# Patient Record
Sex: FEMALE | Race: WHITE | NOT HISPANIC OR LATINO | Employment: OTHER | URBAN - METROPOLITAN AREA
[De-identification: names, ages, dates, MRNs, and addresses within clinical notes are randomized per-mention and may not be internally consistent; named-entity substitution may affect disease eponyms.]

---

## 2020-09-23 ENCOUNTER — DOCUMENTATION (OUTPATIENT)
Dept: PREADMISSION TESTING | Facility: HOSPITAL | Age: 75
End: 2020-09-23

## 2020-09-25 RX ORDER — VITAMIN E 268 MG
400 CAPSULE ORAL
COMMUNITY
End: 2021-01-25 | Stop reason: HOSPADM

## 2020-09-25 RX ORDER — CIPROFLOXACIN HYDROCHLORIDE 3.5 MG/ML
1 SOLUTION/ DROPS TOPICAL 3 TIMES DAILY
Status: ON HOLD | COMMUNITY
End: 2020-10-05 | Stop reason: SDUPTHER

## 2020-09-25 RX ORDER — CLONIDINE HYDROCHLORIDE 0.2 MG/1
0.2 TABLET ORAL 2 TIMES DAILY
COMMUNITY

## 2020-09-25 RX ORDER — ATROPINE SULFATE 10 MG/ML
1 SOLUTION/ DROPS OPHTHALMIC 3 TIMES DAILY
COMMUNITY
End: 2020-10-05 | Stop reason: HOSPADM

## 2020-09-25 RX ORDER — PREDNISOLONE ACETATE 10 MG/ML
1 SUSPENSION/ DROPS OPHTHALMIC 4 TIMES DAILY
COMMUNITY
End: 2020-11-06

## 2020-09-25 RX ORDER — DOXAZOSIN 2 MG/1
2 TABLET ORAL
COMMUNITY
Start: 2020-09-03

## 2020-09-25 RX ORDER — KETOROLAC TROMETHAMINE 5 MG/ML
1 SOLUTION OPHTHALMIC 4 TIMES DAILY
COMMUNITY
End: 2020-11-06

## 2020-09-29 DIAGNOSIS — Z20.822 COVID-19 RULED OUT BY LABORATORY TESTING: ICD-10-CM

## 2020-09-29 PROCEDURE — U0003 INFECTIOUS AGENT DETECTION BY NUCLEIC ACID (DNA OR RNA); SEVERE ACUTE RESPIRATORY SYNDROME CORONAVIRUS 2 (SARS-COV-2) (CORONAVIRUS DISEASE [COVID-19]), AMPLIFIED PROBE TECHNIQUE, MAKING USE OF HIGH THROUGHPUT TECHNOLOGIES AS DESCRIBED BY CMS-2020-01-R: HCPCS | Performed by: OPHTHALMOLOGY

## 2020-09-30 LAB — SARS-COV-2 RNA SPEC QL NAA+PROBE: NOT DETECTED

## 2020-10-04 ENCOUNTER — ANESTHESIA EVENT (OUTPATIENT)
Dept: PERIOP | Facility: AMBULARY SURGERY CENTER | Age: 75
End: 2020-10-04
Payer: MEDICARE

## 2020-10-04 PROBLEM — R11.2 PONV (POSTOPERATIVE NAUSEA AND VOMITING): Status: ACTIVE | Noted: 2020-10-04

## 2020-10-04 PROBLEM — E78.5 HYPERLIPIDEMIA: Status: ACTIVE | Noted: 2020-10-04

## 2020-10-04 PROBLEM — I10 HTN (HYPERTENSION): Status: ACTIVE | Noted: 2020-10-04

## 2020-10-04 PROBLEM — Z92.89 HISTORY OF PSYCHIATRIC CARE: Status: ACTIVE | Noted: 2020-10-04

## 2020-10-04 PROBLEM — Z90.710 HISTORY OF HYSTERECTOMY: Status: ACTIVE | Noted: 2020-10-04

## 2020-10-04 PROBLEM — H53.9 VISUAL DISORDER: Status: ACTIVE | Noted: 2020-10-04

## 2020-10-04 PROBLEM — K21.9 GASTROESOPHAGEAL REFLUX DISEASE: Status: ACTIVE | Noted: 2020-10-04

## 2020-10-04 PROBLEM — Z98.890 PONV (POSTOPERATIVE NAUSEA AND VOMITING): Status: ACTIVE | Noted: 2020-10-04

## 2020-10-04 PROBLEM — F41.9 ANXIETY: Status: ACTIVE | Noted: 2020-10-04

## 2020-10-04 PROBLEM — M19.90 ARTHRITIS: Status: ACTIVE | Noted: 2020-10-04

## 2020-10-04 RX ORDER — LIDOCAINE HYDROCHLORIDE 20 MG/ML
1 JELLY TOPICAL
Status: COMPLETED | OUTPATIENT
Start: 2020-10-04 | End: 2020-10-05

## 2020-10-05 ENCOUNTER — HOSPITAL ENCOUNTER (OUTPATIENT)
Facility: AMBULARY SURGERY CENTER | Age: 75
Setting detail: OUTPATIENT SURGERY
Discharge: HOME | End: 2020-10-05
Attending: OPHTHALMOLOGY | Admitting: OPHTHALMOLOGY
Payer: MEDICARE

## 2020-10-05 ENCOUNTER — ANESTHESIA (OUTPATIENT)
Dept: PERIOP | Facility: AMBULARY SURGERY CENTER | Age: 75
End: 2020-10-05
Payer: MEDICARE

## 2020-10-05 VITALS
TEMPERATURE: 96.5 F | SYSTOLIC BLOOD PRESSURE: 219 MMHG | WEIGHT: 207 LBS | BODY MASS INDEX: 31.37 KG/M2 | HEIGHT: 68 IN | RESPIRATION RATE: 20 BRPM | OXYGEN SATURATION: 95 % | HEART RATE: 60 BPM | DIASTOLIC BLOOD PRESSURE: 89 MMHG

## 2020-10-05 VITALS — HEART RATE: 63 BPM

## 2020-10-05 DIAGNOSIS — Z20.822 COVID-19 RULED OUT BY LABORATORY TESTING: Primary | ICD-10-CM

## 2020-10-05 DIAGNOSIS — H25.12 AGE-RELATED NUCLEAR CATARACT OF LEFT EYE: ICD-10-CM

## 2020-10-05 PROCEDURE — V2632 POST CHMBR INTRAOCULAR LENS: HCPCS | Performed by: OPHTHALMOLOGY

## 2020-10-05 DEVICE — IOL SN60WF 21.5: Type: IMPLANTABLE DEVICE | Site: EYE | Status: FUNCTIONAL

## 2020-10-05 RX ORDER — LIDOCAINE HYDROCHLORIDE 10 MG/ML
INJECTION, SOLUTION EPIDURAL; INFILTRATION; INTRACAUDAL; PERINEURAL AS NEEDED
Status: DISCONTINUED | OUTPATIENT
Start: 2020-10-05 | End: 2020-10-05 | Stop reason: HOSPADM

## 2020-10-05 RX ORDER — PHENYLEPHRINE HCL 2.5 %
1 DROPS OPHTHALMIC (EYE)
Status: COMPLETED | OUTPATIENT
Start: 2020-10-05 | End: 2020-10-05

## 2020-10-05 RX ORDER — TETRACAINE HYDROCHLORIDE 5 MG/ML
1 SOLUTION OPHTHALMIC ONCE
Status: COMPLETED | OUTPATIENT
Start: 2020-10-05 | End: 2020-10-05

## 2020-10-05 RX ORDER — FENTANYL CITRATE 50 UG/ML
INJECTION, SOLUTION INTRAMUSCULAR; INTRAVENOUS AS NEEDED
Status: DISCONTINUED | OUTPATIENT
Start: 2020-10-05 | End: 2020-10-05

## 2020-10-05 RX ORDER — ONDANSETRON 2 MG/ML
INJECTION INTRAMUSCULAR; INTRAVENOUS AS NEEDED
Status: DISCONTINUED | OUTPATIENT
Start: 2020-10-05 | End: 2020-10-05

## 2020-10-05 RX ORDER — CIPROFLOXACIN HYDROCHLORIDE 3.5 MG/ML
1 SOLUTION/ DROPS TOPICAL 4 TIMES DAILY
Qty: 5 ML | Refills: 0 | Status: SHIPPED | OUTPATIENT
Start: 2020-10-05 | End: 2020-11-06

## 2020-10-05 RX ORDER — TETRACAINE HYDROCHLORIDE 5 MG/ML
SOLUTION OPHTHALMIC AS NEEDED
Status: DISCONTINUED | OUTPATIENT
Start: 2020-10-05 | End: 2020-10-05 | Stop reason: HOSPADM

## 2020-10-05 RX ORDER — DEXAMETHASONE SODIUM PHOSPHATE 4 MG/ML
INJECTION, SOLUTION INTRA-ARTICULAR; INTRALESIONAL; INTRAMUSCULAR; INTRAVENOUS; SOFT TISSUE AS NEEDED
Status: DISCONTINUED | OUTPATIENT
Start: 2020-10-05 | End: 2020-10-05

## 2020-10-05 RX ORDER — GATIFLOXACIN 5 MG/ML
SOLUTION/ DROPS OPHTHALMIC AS NEEDED
Status: DISCONTINUED | OUTPATIENT
Start: 2020-10-05 | End: 2020-10-05 | Stop reason: HOSPADM

## 2020-10-05 RX ORDER — BALANCED SALT SOLUTION 6.4; .75; .48; .3; 3.9; 1.7 MG/ML; MG/ML; MG/ML; MG/ML; MG/ML; MG/ML
SOLUTION OPHTHALMIC AS NEEDED
Status: DISCONTINUED | OUTPATIENT
Start: 2020-10-05 | End: 2020-10-05 | Stop reason: HOSPADM

## 2020-10-05 RX ORDER — CYCLOPENTOLATE HYDROCHLORIDE 10 MG/ML
1 SOLUTION/ DROPS OPHTHALMIC
Status: COMPLETED | OUTPATIENT
Start: 2020-10-05 | End: 2020-10-05

## 2020-10-05 RX ADMIN — PHENYLEPHRINE HYDROCHLORIDE 1 DROP: 25 SOLUTION/ DROPS OPHTHALMIC at 07:15

## 2020-10-05 RX ADMIN — CYCLOPENTOLATE HYDROCHLORIDE 1 DROP: 10 SOLUTION/ DROPS OPHTHALMIC at 07:30

## 2020-10-05 RX ADMIN — FENTANYL CITRATE 100 MCG: 50 INJECTION, SOLUTION INTRAMUSCULAR; INTRAVENOUS at 07:42

## 2020-10-05 RX ADMIN — TETRACAINE HYDROCHLORIDE 1 DROP: 5 SOLUTION OPHTHALMIC at 07:04

## 2020-10-05 RX ADMIN — PHENYLEPHRINE HYDROCHLORIDE 1 DROP: 25 SOLUTION/ DROPS OPHTHALMIC at 07:30

## 2020-10-05 RX ADMIN — DEXAMETHASONE SODIUM PHOSPHATE 4 MG: 4 INJECTION, SOLUTION INTRA-ARTICULAR; INTRALESIONAL; INTRAMUSCULAR; INTRAVENOUS; SOFT TISSUE at 07:42

## 2020-10-05 RX ADMIN — PHENYLEPHRINE HYDROCHLORIDE 1 DROP: 25 SOLUTION/ DROPS OPHTHALMIC at 07:04

## 2020-10-05 RX ADMIN — LIDOCAINE HYDROCHLORIDE 1 APPLICATION: 20 JELLY TOPICAL at 07:04

## 2020-10-05 RX ADMIN — CYCLOPENTOLATE HYDROCHLORIDE 1 DROP: 10 SOLUTION/ DROPS OPHTHALMIC at 07:04

## 2020-10-05 RX ADMIN — CYCLOPENTOLATE HYDROCHLORIDE 1 DROP: 10 SOLUTION/ DROPS OPHTHALMIC at 07:39

## 2020-10-05 RX ADMIN — PHENYLEPHRINE HYDROCHLORIDE 1 DROP: 25 SOLUTION/ DROPS OPHTHALMIC at 07:40

## 2020-10-05 RX ADMIN — LIDOCAINE HYDROCHLORIDE 1 APPLICATION: 20 JELLY TOPICAL at 07:15

## 2020-10-05 RX ADMIN — ONDANSETRON 4 MG: 2 INJECTION INTRAMUSCULAR; INTRAVENOUS at 07:42

## 2020-10-05 RX ADMIN — CYCLOPENTOLATE HYDROCHLORIDE 1 DROP: 10 SOLUTION/ DROPS OPHTHALMIC at 07:15

## 2020-10-05 RX ADMIN — LIDOCAINE HYDROCHLORIDE 1 APPLICATION: 20 JELLY TOPICAL at 07:30

## 2020-11-06 RX ORDER — ASPIRIN 81 MG/1
TABLET ORAL EVERY EVENING
COMMUNITY

## 2020-11-10 DIAGNOSIS — Z20.822 COVID-19 RULED OUT BY LABORATORY TESTING: ICD-10-CM

## 2020-11-10 PROCEDURE — U0003 INFECTIOUS AGENT DETECTION BY NUCLEIC ACID (DNA OR RNA); SEVERE ACUTE RESPIRATORY SYNDROME CORONAVIRUS 2 (SARS-COV-2) (CORONAVIRUS DISEASE [COVID-19]), AMPLIFIED PROBE TECHNIQUE, MAKING USE OF HIGH THROUGHPUT TECHNOLOGIES AS DESCRIBED BY CMS-2020-01-R: HCPCS | Performed by: OPHTHALMOLOGY

## 2020-11-11 ENCOUNTER — TRANSCRIBE ORDERS (OUTPATIENT)
Dept: ADMINISTRATIVE | Facility: HOSPITAL | Age: 75
End: 2020-11-11

## 2020-11-11 DIAGNOSIS — Z12.31 ENCOUNTER FOR SCREENING MAMMOGRAM FOR MALIGNANT NEOPLASM OF BREAST: Primary | ICD-10-CM

## 2020-11-11 LAB — SARS-COV-2 RNA SPEC QL NAA+PROBE: NOT DETECTED

## 2020-11-15 ENCOUNTER — ANESTHESIA EVENT (OUTPATIENT)
Dept: PERIOP | Facility: AMBULARY SURGERY CENTER | Age: 75
End: 2020-11-15
Payer: MEDICARE

## 2020-11-16 ENCOUNTER — HOSPITAL ENCOUNTER (OUTPATIENT)
Facility: AMBULARY SURGERY CENTER | Age: 75
Setting detail: OUTPATIENT SURGERY
Discharge: HOME | End: 2020-11-16
Attending: OPHTHALMOLOGY | Admitting: OPHTHALMOLOGY
Payer: MEDICARE

## 2020-11-16 ENCOUNTER — ANESTHESIA (OUTPATIENT)
Dept: PERIOP | Facility: AMBULARY SURGERY CENTER | Age: 75
End: 2020-11-16
Payer: MEDICARE

## 2020-11-16 VITALS
HEART RATE: 62 BPM | SYSTOLIC BLOOD PRESSURE: 206 MMHG | TEMPERATURE: 96.5 F | RESPIRATION RATE: 18 BRPM | DIASTOLIC BLOOD PRESSURE: 95 MMHG | OXYGEN SATURATION: 96 %

## 2020-11-16 VITALS — HEART RATE: 63 BPM

## 2020-11-16 DIAGNOSIS — Z20.822 COVID-19 RULED OUT BY LABORATORY TESTING: Primary | ICD-10-CM

## 2020-11-16 DIAGNOSIS — H25.11 AGE-RELATED NUCLEAR CATARACT OF RIGHT EYE: ICD-10-CM

## 2020-11-16 PROCEDURE — V2632 POST CHMBR INTRAOCULAR LENS: HCPCS | Performed by: OPHTHALMOLOGY

## 2020-11-16 DEVICE — ACRYSOF(R) IQ ASPHERIC NATURAL IOL, SINGLE-PIECE ACRYLIC FOLDABLE PCL, UV WITH BLUE LIGHTFILTER, 13.0MM LENGTH, 6.0MM ANTERIORASYMMETRIC BICONVEX OPTIC, PLANAR HAPTICS.
Type: IMPLANTABLE DEVICE | Status: FUNCTIONAL
Brand: ACRYSOF®

## 2020-11-16 RX ORDER — TETRACAINE HYDROCHLORIDE 5 MG/ML
SOLUTION OPHTHALMIC AS NEEDED
Status: DISCONTINUED | OUTPATIENT
Start: 2020-11-16 | End: 2020-11-16 | Stop reason: HOSPADM

## 2020-11-16 RX ORDER — DEXAMETHASONE SODIUM PHOSPHATE 4 MG/ML
INJECTION, SOLUTION INTRA-ARTICULAR; INTRALESIONAL; INTRAMUSCULAR; INTRAVENOUS; SOFT TISSUE AS NEEDED
Status: DISCONTINUED | OUTPATIENT
Start: 2020-11-16 | End: 2020-11-16

## 2020-11-16 RX ORDER — KETOROLAC TROMETHAMINE 5 MG/ML
1 SOLUTION OPHTHALMIC 4 TIMES DAILY
Qty: 5 ML | Refills: 0
Start: 2020-11-16 | End: 2021-01-25 | Stop reason: HOSPADM

## 2020-11-16 RX ORDER — PROPOFOL 10 MG/ML
INJECTION, EMULSION INTRAVENOUS AS NEEDED
Status: DISCONTINUED | OUTPATIENT
Start: 2020-11-16 | End: 2020-11-16

## 2020-11-16 RX ORDER — BALANCED SALT SOLUTION 6.4; .75; .48; .3; 3.9; 1.7 MG/ML; MG/ML; MG/ML; MG/ML; MG/ML; MG/ML
SOLUTION OPHTHALMIC AS NEEDED
Status: DISCONTINUED | OUTPATIENT
Start: 2020-11-16 | End: 2020-11-16 | Stop reason: HOSPADM

## 2020-11-16 RX ORDER — TETRACAINE HYDROCHLORIDE 5 MG/ML
1 SOLUTION OPHTHALMIC ONCE
Status: COMPLETED | OUTPATIENT
Start: 2020-11-16 | End: 2020-11-16

## 2020-11-16 RX ORDER — CIPROFLOXACIN HYDROCHLORIDE 3.5 MG/ML
1 SOLUTION/ DROPS TOPICAL 4 TIMES DAILY
Qty: 5 ML | Refills: 0
Start: 2020-11-16 | End: 2021-01-25 | Stop reason: HOSPADM

## 2020-11-16 RX ORDER — GATIFLOXACIN 5 MG/ML
SOLUTION/ DROPS OPHTHALMIC AS NEEDED
Status: DISCONTINUED | OUTPATIENT
Start: 2020-11-16 | End: 2020-11-16 | Stop reason: HOSPADM

## 2020-11-16 RX ORDER — CYCLOPENTOLATE HYDROCHLORIDE 10 MG/ML
1 SOLUTION/ DROPS OPHTHALMIC
Status: COMPLETED | OUTPATIENT
Start: 2020-11-16 | End: 2020-11-16

## 2020-11-16 RX ORDER — FENTANYL CITRATE 50 UG/ML
INJECTION, SOLUTION INTRAMUSCULAR; INTRAVENOUS AS NEEDED
Status: DISCONTINUED | OUTPATIENT
Start: 2020-11-16 | End: 2020-11-16

## 2020-11-16 RX ORDER — PHENYLEPHRINE HCL 2.5 %
1 DROPS OPHTHALMIC (EYE)
Status: COMPLETED | OUTPATIENT
Start: 2020-11-16 | End: 2020-11-16

## 2020-11-16 RX ORDER — ONDANSETRON 2 MG/ML
INJECTION INTRAMUSCULAR; INTRAVENOUS AS NEEDED
Status: DISCONTINUED | OUTPATIENT
Start: 2020-11-16 | End: 2020-11-16

## 2020-11-16 RX ADMIN — PROPOFOL 30 MG: 10 INJECTION, EMULSION INTRAVENOUS at 08:56

## 2020-11-16 RX ADMIN — ONDANSETRON 4 MG: 2 INJECTION INTRAMUSCULAR; INTRAVENOUS at 08:59

## 2020-11-16 RX ADMIN — CYCLOPENTOLATE HYDROCHLORIDE 1 DROP: 10 SOLUTION/ DROPS OPHTHALMIC at 08:39

## 2020-11-16 RX ADMIN — CYCLOPENTOLATE HYDROCHLORIDE 1 DROP: 10 SOLUTION/ DROPS OPHTHALMIC at 08:24

## 2020-11-16 RX ADMIN — PHENYLEPHRINE HYDROCHLORIDE 1 DROP: 25 SOLUTION/ DROPS OPHTHALMIC at 08:39

## 2020-11-16 RX ADMIN — PHENYLEPHRINE HYDROCHLORIDE 1 DROP: 25 SOLUTION/ DROPS OPHTHALMIC at 07:55

## 2020-11-16 RX ADMIN — CYCLOPENTOLATE HYDROCHLORIDE 1 DROP: 10 SOLUTION/ DROPS OPHTHALMIC at 08:10

## 2020-11-16 RX ADMIN — CYCLOPENTOLATE HYDROCHLORIDE 1 DROP: 10 SOLUTION/ DROPS OPHTHALMIC at 07:55

## 2020-11-16 RX ADMIN — PHENYLEPHRINE HYDROCHLORIDE 1 DROP: 25 SOLUTION/ DROPS OPHTHALMIC at 08:24

## 2020-11-16 RX ADMIN — FENTANYL CITRATE 100 MCG: 50 INJECTION, SOLUTION INTRAMUSCULAR; INTRAVENOUS at 08:56

## 2020-11-16 RX ADMIN — DEXAMETHASONE SODIUM PHOSPHATE 4 MG: 4 INJECTION, SOLUTION INTRA-ARTICULAR; INTRALESIONAL; INTRAMUSCULAR; INTRAVENOUS; SOFT TISSUE at 08:59

## 2020-11-16 RX ADMIN — PHENYLEPHRINE HYDROCHLORIDE 1 DROP: 25 SOLUTION/ DROPS OPHTHALMIC at 08:10

## 2020-11-16 RX ADMIN — TETRACAINE HYDROCHLORIDE 1 DROP: 5 SOLUTION OPHTHALMIC at 07:55

## 2021-01-05 ENCOUNTER — HOSPITAL ENCOUNTER (OUTPATIENT)
Dept: RADIOLOGY | Facility: HOSPITAL | Age: 76
Discharge: HOME | End: 2021-01-05
Payer: MEDICARE

## 2021-01-05 VITALS — WEIGHT: 204 LBS | HEIGHT: 64 IN | BODY MASS INDEX: 34.83 KG/M2

## 2021-01-05 DIAGNOSIS — Z12.31 ENCOUNTER FOR SCREENING MAMMOGRAM FOR MALIGNANT NEOPLASM OF BREAST: ICD-10-CM

## 2021-01-05 PROCEDURE — 77067 SCR MAMMO BI INCL CAD: CPT

## 2021-01-05 PROCEDURE — 77063 BREAST TOMOSYNTHESIS BI: CPT

## 2021-01-23 ENCOUNTER — APPOINTMENT (EMERGENCY)
Dept: CT IMAGING | Facility: HOSPITAL | Age: 76
DRG: 062 | End: 2021-01-23
Payer: MEDICARE

## 2021-01-23 ENCOUNTER — HOSPITAL ENCOUNTER (INPATIENT)
Facility: HOSPITAL | Age: 76
LOS: 2 days | Discharge: HOME | DRG: 062 | End: 2021-01-25
Attending: EMERGENCY MEDICINE | Admitting: INTERNAL MEDICINE
Payer: MEDICARE

## 2021-01-23 DIAGNOSIS — I63.9 CVA (CEREBRAL VASCULAR ACCIDENT) (HCC): Primary | ICD-10-CM

## 2021-01-23 DIAGNOSIS — I63.9 STROKE (CEREBRUM) (HCC): ICD-10-CM

## 2021-01-23 DIAGNOSIS — E87.6 HYPOKALEMIA: ICD-10-CM

## 2021-01-23 PROBLEM — K21.9 GASTROESOPHAGEAL REFLUX DISEASE: Chronic | Status: ACTIVE | Noted: 2020-10-04

## 2021-01-23 PROBLEM — R29.90 STROKE-LIKE EPISODE: Status: ACTIVE | Noted: 2021-01-23

## 2021-01-23 PROBLEM — I10 HTN (HYPERTENSION): Chronic | Status: ACTIVE | Noted: 2020-10-04

## 2021-01-23 PROBLEM — I77.9 BILATERAL CAROTID ARTERY DISEASE (HCC): Status: ACTIVE | Noted: 2019-05-23

## 2021-01-23 PROBLEM — F41.9 ANXIETY: Chronic | Status: ACTIVE | Noted: 2020-10-04

## 2021-01-23 PROBLEM — E78.5 HYPERLIPIDEMIA: Chronic | Status: ACTIVE | Noted: 2020-10-04

## 2021-01-23 LAB
ANION GAP SERPL CALCULATED.3IONS-SCNC: 8 MMOL/L (ref 4–13)
APTT PPP: 30 SECONDS (ref 23–37)
ATRIAL RATE: 65 BPM
ATRIAL RATE: 69 BPM
BUN SERPL-MCNC: 10 MG/DL (ref 5–25)
CALCIUM SERPL-MCNC: 8.9 MG/DL (ref 8.3–10.1)
CHLORIDE SERPL-SCNC: 106 MMOL/L (ref 100–108)
CHOLEST SERPL-MCNC: 176 MG/DL (ref 50–200)
CO2 SERPL-SCNC: 27 MMOL/L (ref 21–32)
CREAT SERPL-MCNC: 0.88 MG/DL (ref 0.6–1.3)
ERYTHROCYTE [DISTWIDTH] IN BLOOD BY AUTOMATED COUNT: 13.9 % (ref 11.6–15.1)
EST. AVERAGE GLUCOSE BLD GHB EST-MCNC: 111 MG/DL
FLUAV RNA RESP QL NAA+PROBE: NEGATIVE
FLUBV RNA RESP QL NAA+PROBE: NEGATIVE
GFR SERPL CREATININE-BSD FRML MDRD: 65 ML/MIN/1.73SQ M
GLUCOSE SERPL-MCNC: 115 MG/DL (ref 65–140)
GLUCOSE SERPL-MCNC: 124 MG/DL (ref 65–140)
HBA1C MFR BLD: 5.5 %
HCT VFR BLD AUTO: 41.5 % (ref 34.8–46.1)
HDLC SERPL-MCNC: 67 MG/DL
HGB BLD-MCNC: 13.2 G/DL (ref 11.5–15.4)
INR PPP: 1.05 (ref 0.84–1.19)
LDLC SERPL CALC-MCNC: 94 MG/DL (ref 0–100)
MCH RBC QN AUTO: 29.3 PG (ref 26.8–34.3)
MCHC RBC AUTO-ENTMCNC: 31.8 G/DL (ref 31.4–37.4)
MCV RBC AUTO: 92 FL (ref 82–98)
P AXIS: 28 DEGREES
P AXIS: 63 DEGREES
PLATELET # BLD AUTO: 244 THOUSANDS/UL (ref 149–390)
PMV BLD AUTO: 10.6 FL (ref 8.9–12.7)
POTASSIUM SERPL-SCNC: 3.2 MMOL/L (ref 3.5–5.3)
PR INTERVAL: 150 MS
PR INTERVAL: 154 MS
PROTHROMBIN TIME: 13.8 SECONDS (ref 11.6–14.5)
QRS AXIS: -15 DEGREES
QRS AXIS: -22 DEGREES
QRSD INTERVAL: 86 MS
QRSD INTERVAL: 88 MS
QT INTERVAL: 382 MS
QT INTERVAL: 400 MS
QTC INTERVAL: 409 MS
QTC INTERVAL: 416 MS
RBC # BLD AUTO: 4.51 MILLION/UL (ref 3.81–5.12)
RSV RNA RESP QL NAA+PROBE: NEGATIVE
SARS-COV-2 RNA RESP QL NAA+PROBE: NEGATIVE
SODIUM SERPL-SCNC: 141 MMOL/L (ref 136–145)
T WAVE AXIS: 35 DEGREES
T WAVE AXIS: 38 DEGREES
TRIGL SERPL-MCNC: 75 MG/DL
TROPONIN I SERPL-MCNC: 0.02 NG/ML
VENTRICULAR RATE: 65 BPM
VENTRICULAR RATE: 69 BPM
WBC # BLD AUTO: 7.39 THOUSAND/UL (ref 4.31–10.16)

## 2021-01-23 PROCEDURE — 84484 ASSAY OF TROPONIN QUANT: CPT | Performed by: EMERGENCY MEDICINE

## 2021-01-23 PROCEDURE — 70496 CT ANGIOGRAPHY HEAD: CPT

## 2021-01-23 PROCEDURE — 93005 ELECTROCARDIOGRAM TRACING: CPT

## 2021-01-23 PROCEDURE — 96374 THER/PROPH/DIAG INJ IV PUSH: CPT

## 2021-01-23 PROCEDURE — 99291 CRITICAL CARE FIRST HOUR: CPT | Performed by: EMERGENCY MEDICINE

## 2021-01-23 PROCEDURE — G1004 CDSM NDSC: HCPCS

## 2021-01-23 PROCEDURE — 82948 REAGENT STRIP/BLOOD GLUCOSE: CPT

## 2021-01-23 PROCEDURE — 99223 1ST HOSP IP/OBS HIGH 75: CPT | Performed by: PSYCHIATRY & NEUROLOGY

## 2021-01-23 PROCEDURE — 3E03317 INTRODUCTION OF OTHER THROMBOLYTIC INTO PERIPHERAL VEIN, PERCUTANEOUS APPROACH: ICD-10-PCS | Performed by: EMERGENCY MEDICINE

## 2021-01-23 PROCEDURE — 0241U HB NFCT DS VIR RESP RNA 4 TRGT: CPT | Performed by: EMERGENCY MEDICINE

## 2021-01-23 PROCEDURE — 36415 COLL VENOUS BLD VENIPUNCTURE: CPT | Performed by: EMERGENCY MEDICINE

## 2021-01-23 PROCEDURE — 80048 BASIC METABOLIC PNL TOTAL CA: CPT | Performed by: EMERGENCY MEDICINE

## 2021-01-23 PROCEDURE — 99223 1ST HOSP IP/OBS HIGH 75: CPT | Performed by: INTERNAL MEDICINE

## 2021-01-23 PROCEDURE — 70498 CT ANGIOGRAPHY NECK: CPT

## 2021-01-23 PROCEDURE — 93010 ELECTROCARDIOGRAM REPORT: CPT | Performed by: INTERNAL MEDICINE

## 2021-01-23 PROCEDURE — 99291 CRITICAL CARE FIRST HOUR: CPT

## 2021-01-23 PROCEDURE — 83036 HEMOGLOBIN GLYCOSYLATED A1C: CPT | Performed by: NURSE PRACTITIONER

## 2021-01-23 PROCEDURE — 96375 TX/PRO/DX INJ NEW DRUG ADDON: CPT

## 2021-01-23 PROCEDURE — 80061 LIPID PANEL: CPT | Performed by: NURSE PRACTITIONER

## 2021-01-23 PROCEDURE — 85730 THROMBOPLASTIN TIME PARTIAL: CPT | Performed by: EMERGENCY MEDICINE

## 2021-01-23 PROCEDURE — 85027 COMPLETE CBC AUTOMATED: CPT | Performed by: EMERGENCY MEDICINE

## 2021-01-23 PROCEDURE — 85610 PROTHROMBIN TIME: CPT | Performed by: EMERGENCY MEDICINE

## 2021-01-23 RX ORDER — POTASSIUM CHLORIDE 20 MEQ/1
20 TABLET, EXTENDED RELEASE ORAL ONCE
Status: COMPLETED | OUTPATIENT
Start: 2021-01-23 | End: 2021-01-23

## 2021-01-23 RX ORDER — ATORVASTATIN CALCIUM 40 MG/1
80 TABLET, FILM COATED ORAL EVERY EVENING
Status: DISCONTINUED | OUTPATIENT
Start: 2021-01-23 | End: 2021-01-23

## 2021-01-23 RX ORDER — DOXAZOSIN MESYLATE 1 MG/1
2 TABLET ORAL
Status: DISCONTINUED | OUTPATIENT
Start: 2021-01-23 | End: 2021-01-25 | Stop reason: HOSPADM

## 2021-01-23 RX ORDER — LABETALOL 20 MG/4 ML (5 MG/ML) INTRAVENOUS SYRINGE
10 ONCE
Status: COMPLETED | OUTPATIENT
Start: 2021-01-23 | End: 2021-01-23

## 2021-01-23 RX ORDER — HYDRALAZINE HYDROCHLORIDE 20 MG/ML
5 INJECTION INTRAMUSCULAR; INTRAVENOUS ONCE
Status: COMPLETED | OUTPATIENT
Start: 2021-01-23 | End: 2021-01-23

## 2021-01-23 RX ORDER — DIPHENHYDRAMINE HCL 25 MG
25 TABLET ORAL ONCE
Status: COMPLETED | OUTPATIENT
Start: 2021-01-23 | End: 2021-01-23

## 2021-01-23 RX ORDER — DIPHENHYDRAMINE HYDROCHLORIDE 50 MG/ML
INJECTION INTRAMUSCULAR; INTRAVENOUS
Status: DISCONTINUED
Start: 2021-01-23 | End: 2021-01-24 | Stop reason: WASHOUT

## 2021-01-23 RX ORDER — LABETALOL 20 MG/4 ML (5 MG/ML) INTRAVENOUS SYRINGE
10 EVERY 4 HOURS PRN
Status: DISCONTINUED | OUTPATIENT
Start: 2021-01-23 | End: 2021-01-25 | Stop reason: HOSPADM

## 2021-01-23 RX ORDER — HYDRALAZINE HYDROCHLORIDE 20 MG/ML
10 INJECTION INTRAMUSCULAR; INTRAVENOUS EVERY 6 HOURS PRN
Status: DISCONTINUED | OUTPATIENT
Start: 2021-01-23 | End: 2021-01-25 | Stop reason: HOSPADM

## 2021-01-23 RX ORDER — CLONIDINE HYDROCHLORIDE 0.1 MG/1
0.2 TABLET ORAL 2 TIMES DAILY
Status: DISCONTINUED | OUTPATIENT
Start: 2021-01-24 | End: 2021-01-25

## 2021-01-23 RX ADMIN — SODIUM CHLORIDE 50 ML: 9 INJECTION, SOLUTION INTRAVENOUS at 10:13

## 2021-01-23 RX ADMIN — SODIUM CHLORIDE 1 MG/MIN: 0.9 INJECTION, SOLUTION INTRAVENOUS at 22:52

## 2021-01-23 RX ADMIN — LABETALOL 20 MG/4 ML (5 MG/ML) INTRAVENOUS SYRINGE 10 MG: at 14:48

## 2021-01-23 RX ADMIN — POTASSIUM CHLORIDE 20 MEQ: 1500 TABLET, EXTENDED RELEASE ORAL at 08:03

## 2021-01-23 RX ADMIN — LABETALOL 20 MG/4 ML (5 MG/ML) INTRAVENOUS SYRINGE 10 MG: at 07:55

## 2021-01-23 RX ADMIN — DOXAZOSIN 2 MG: 1 TABLET ORAL at 22:26

## 2021-01-23 RX ADMIN — IOHEXOL 85 ML: 350 INJECTION, SOLUTION INTRAVENOUS at 07:49

## 2021-01-23 RX ADMIN — DIPHENHYDRAMINE HCL 25 MG: 25 TABLET, COATED ORAL at 14:52

## 2021-01-23 RX ADMIN — HYDRALAZINE HYDROCHLORIDE 5 MG: 20 INJECTION, SOLUTION INTRAMUSCULAR; INTRAVENOUS at 08:33

## 2021-01-23 RX ADMIN — SODIUM CHLORIDE 1 MG/MIN: 0.9 INJECTION, SOLUTION INTRAVENOUS at 15:25

## 2021-01-23 RX ADMIN — HYDRALAZINE HYDROCHLORIDE 10 MG: 20 INJECTION, SOLUTION INTRAMUSCULAR; INTRAVENOUS at 13:08

## 2021-01-23 RX ADMIN — LABETALOL 20 MG/4 ML (5 MG/ML) INTRAVENOUS SYRINGE 10 MG: at 08:12

## 2021-01-23 RX ADMIN — ALTEPLASE 76.3 MG: KIT at 09:13

## 2021-01-23 NOTE — ED PROVIDER NOTES
History  Chief Complaint   Patient presents with    CVA/TIA-like Symptoms     pt reports slurred speech last night at 2100 w/ L hand weakness, denies slurred speech today, c/o being off balance      31-year-old female presents the emergency department by EMS from home for evaluation feeling off balance  Patient states that yesterday she had a very stressful day  Her  was placed on hospice and she had to transport him to several appointments  She admits to feeling exhausted last night  She was on the phone with a friend around 8:30 p m  When she became extremely fatigued  Her friend noted that her speech sounded slurred  Patient got off the phone and then got up to walk to the couch and felt off balance  She also felt some mild numbness and tingling of the left hand that has since resolved  Patient states that she fell sleep on a couch for 2 hours and then woke up and ate some ice cream with pretzels  She went back to sleep and slept 0530 this morning  When she got up she was able to ambulate normally to the bathroom to rinse out her mouth and have a bowel movement  As she was getting at the sink she began to feel unsteady and off balance again  She states that she has been ambulating as if she is drunk and is feeling dizzy  She denies recurrence of slurred speech  She denies headache  No change in vision though notes that she had recent cataract surgery on both eyes (October/November 2020)  Patient states that her left arm last night felt a little bit weak when she was holding the phone in her left hand but it is feeling back to normal today and she has been able to use it without difficulty        History provided by:  Patient, medical records and EMS personnel  History limited by:  Acuity of condition   used: No    CVA/TIA-like Symptoms  Presenting symptoms: loss of balance    Presenting symptoms: no confusion, no headaches and no weakness    Date/time of last known well: 1/23/2021 5:30 AM  Onset quality:  Sudden  Timing:  Constant  Progression:  Unchanged  Similar to previous episodes: no    Associated symptoms: dizziness and paresthesias    Associated symptoms: no fall, no hearing loss, no nausea, no seizures, no tinnitus, no vertigo and no vomiting        Prior to Admission Medications   Prescriptions Last Dose Informant Patient Reported? Taking? Multiple Vitamins-Minerals (PRESERVISION AREDS 2 PO)   Yes Yes   Sig: Take by mouth 2 (two) times a day   aspirin (ECOTRIN LOW STRENGTH) 81 mg EC tablet   Yes Yes   Sig: Take by mouth every evening   ciprofloxacin (CILOXAN) 0 3 % ophthalmic solution Not Taking at Unknown time  No No   Sig: Administer 1 drop to the right eye 4 (four) times a day   Patient not taking: Reported on 1/23/2021   cloNIDine (CATAPRES) 0 2 mg tablet   Yes Yes   Sig: Take 0 2 mg by mouth 2 (two) times a day 1 1/2 tabs BID   doxazosin (CARDURA) 2 mg tablet   Yes Yes   Sig: Take 2 mg by mouth daily at bedtime   ketorolac (ACULAR) 0 5 % ophthalmic solution Not Taking at Unknown time  No No   Sig: Administer 1 drop to the right eye 4 (four) times a day   Patient not taking: Reported on 1/23/2021   vitamin E, tocopherol, 400 units capsule   Yes Yes   Sig: Take 400 Units by mouth daily with dinner      Facility-Administered Medications: None       Past Medical History:   Diagnosis Date    Anesthesia complication     Pt has side effects   from anesthesia EXCEPT propofol      Anxiety     Arthritis     knees    Carotid artery narrowing     60% blockage    Cataract     bilateral    Claustrophobia     Colon polyp     Family history of GERD     Fibromyalgia, primary     Full dentures     since age 25yr    Hearing loss     right ear    History of meningioma of the brain     Hyperlipemia     Hypertension     elevates with stress, "white coat syndrome"    Macular degeneration     small area left eye    PONV (postoperative nausea and vomiting)     with general  Wears glasses     has a prism lens       Past Surgical History:   Procedure Laterality Date    BACK SURGERY      L4,5-laminectomy    BRAIN SURGERY  2018    benign mass-behind right ear at the base of the skull-2nd surgery to reopen to remove infection    BREAST BIOPSY Right     benign    BREAST CYST ASPIRATION Bilateral     multiple sites in the 80s    COLONOSCOPY      x5    EGD      HYSTERECTOMY      complete-large ovarian cyst    LIPOMA RESECTION Left     antecubital    MAMMO NEEDLE LOCALIZATION RIGHT (ALL INC) Right 2009    MAMMO STEREOTACTIC BREAST BIOPSY RIGHT (ALL INC) Right     OOPHORECTOMY      FL XCAPSL CTRC RMVL INSJ IO LENS PROSTH W/O ECP Left 10/5/2020    Procedure: EXTRACTION EXTRACAPSULAR CATARACT PHACO INTRAOCULAR LENS (IOL); Surgeon: Nicolasa Velázquez MD;  Location: [facility] MAIN OR;  Service: Ophthalmology    FL XCAPSL CTRC RMVL INSJ IO LENS PROSTH W/O ECP Right 2020    Procedure: EXTRACTION EXTRACAPSULAR CATARACT PHACO INTRAOCULAR LENS (IOL); Surgeon: Nicolasa Velázquez MD;  Location: [facility] MAIN OR;  Service: Ophthalmology    TONSILLECTOMY      age 34yr       Family History   Problem Relation Age of Onset    Heart disease Mother         MI    Hypertension Mother     Other Mother         polio    Parkinsonism Father     Kidney disease Father     Stroke Father     Stroke Sister     Hypertension Sister     Diabetes Sister         pre-diabetes    Hypertension Brother     [address] Hypertension Maternal Grandfather      I have reviewed and agree with the history as documented  E-Cigarette/Vaping     E-Cigarette/Vaping Substances     Social History     Tobacco Use    Smoking status: Former Smoker     Quit date:      Years since quittin 0    Smokeless tobacco: Never Used   Substance Use Topics    Alcohol use: Never     Frequency: Never    Drug use: Never       Review of Systems   Constitutional: Negative for appetite change and diaphoresis     HENT: Negative for hearing loss and tinnitus  Respiratory: Negative for cough and shortness of breath  Cardiovascular: Negative for leg swelling  Gastrointestinal: Negative for abdominal pain, nausea and vomiting  Genitourinary: Negative for flank pain  Musculoskeletal: Positive for gait problem  Negative for back pain  Neurological: Positive for dizziness, speech difficulty, paresthesias and loss of balance  Negative for vertigo, seizures, facial asymmetry, weakness, light-headedness and headaches  Psychiatric/Behavioral: Negative for confusion and sleep disturbance  The patient is nervous/anxious  Physical Exam  Physical Exam  Constitutional:       Appearance: She is well-developed  HENT:      Head: Normocephalic  Nose: Nose normal       Mouth/Throat:      Pharynx: No oropharyngeal exudate  Eyes:      General: Lids are normal       Extraocular Movements: Extraocular movements intact  Right eye: Normal extraocular motion and no nystagmus  Left eye: Normal extraocular motion and no nystagmus  Conjunctiva/sclera: Conjunctivae normal       Pupils: Pupils are equal, round, and reactive to light  Visual Fields: Right eye visual fields normal and left eye visual fields normal    Neck:      Musculoskeletal: Normal range of motion and neck supple  Cardiovascular:      Rate and Rhythm: Normal rate and regular rhythm  Heart sounds: Normal heart sounds  Pulmonary:      Effort: Pulmonary effort is normal       Breath sounds: Normal breath sounds  Abdominal:      General: Bowel sounds are normal  There is no distension  Palpations: Abdomen is soft  Tenderness: There is no abdominal tenderness  There is no guarding or rebound  Musculoskeletal: Normal range of motion  General: No tenderness or deformity  Lymphadenopathy:      Cervical: No cervical adenopathy  Skin:     General: Skin is warm and dry  Findings: No rash     Neurological:      Mental Status: She is alert and oriented to person, place, and time  Mental status is at baseline  GCS: GCS eye subscore is 4  GCS verbal subscore is 5  GCS motor subscore is 6  Cranial Nerves: Cranial nerves are intact  No cranial nerve deficit  Sensory: Sensation is intact  No sensory deficit  Motor: Motor function is intact  No abnormal muscle tone  Coordination: Romberg sign positive  Coordination normal  Heel to [facility] Test abnormal (left, mild )  Finger-Nose-Finger Test normal       Gait: Gait abnormal and tandem walk abnormal       Deep Tendon Reflexes: Reflexes are normal and symmetric  Psychiatric:         Mood and Affect: Mood is anxious  Behavior: Behavior normal          Thought Content:  Thought content normal          Cognition and Memory: Cognition and memory normal          Judgment: Judgment normal          Vital Signs  ED Triage Vitals   Temperature Pulse Respirations Blood Pressure SpO2   01/23/21 0752 01/23/21 0710 01/23/21 0710 01/23/21 0711 01/23/21 0711   97 5 °F (36 4 °C) 82 16 (!) 202/98 98 %      Temp Source Heart Rate Source Patient Position - Orthostatic VS BP Location FiO2 (%)   01/23/21 0752 01/23/21 0710 01/23/21 0715 01/23/21 0715 --   Oral Monitor Lying Right arm       Pain Score       01/23/21 0800       No Pain           Vitals:    01/23/21 1024 01/23/21 1035 01/23/21 1042 01/23/21 1100   BP: 170/76 (!) 184/80 169/76 159/76   Pulse: 70 71 70 86   Patient Position - Orthostatic VS:             Visual Acuity  Visual Acuity      Most Recent Value   L Pupil Size (mm)  3   R Pupil Size (mm)  3          ED Medications  Medications   labetalol (NORMODYNE) 200 mg in sodium chloride 0 9 % 200 mL infusion (0 mg/min Intravenous Hold 1/23/21 0858)   Labetalol HCl (NORMODYNE) injection 10 mg (10 mg Intravenous Given 1/23/21 0755)   iohexol (OMNIPAQUE) 350 MG/ML injection (MULTI-DOSE) 100 mL (85 mL Intravenous Given 1/23/21 0749)   potassium chloride (K-DUR,KLOR-CON) CR tablet 20 mEq (20 mEq Oral Given 1/23/21 0803)   Labetalol HCl (NORMODYNE) injection 10 mg (10 mg Intravenous Given 1/23/21 0812)   hydrALAZINE (APRESOLINE) injection 5 mg (5 mg Intravenous Given 1/23/21 0833)   alteplase (ACTIVASE) bolus 8 5 mg (8 5 mg Intravenous Given 1/23/21 0909)     Followed by   alteplase (ACTIVASE) infusion 76 3 mg (0 mg/kg × 94 2 kg Intravenous Stopped 1/23/21 1013)     Followed by   sodium chloride 0 9 % bolus 50 mL (50 mL Intravenous Given 1/23/21 1013)       Diagnostic Studies  Results Reviewed     Procedure Component Value Units Date/Time    COVID19, Influenza A/B, RSV PCR, [facility] [[id number]]  (Normal) Collected: 01/23/21 0752    Lab Status: Final result Specimen: Nares from Nose Updated: 01/23/21 0900     SARS-CoV-2 Negative     INFLUENZA A PCR Negative     INFLUENZA B PCR Negative     RSV PCR Negative    Narrative: This test has been authorized by FDA under an EUA (Emergency Use Assay) for use by authorized laboratories  Clinical caution and judgement should be used with the interpretation of these results with consideration of the clinical impression and other laboratory testing  Testing reported as "Positive" or "Negative" has been proven to be accurate according to standard laboratory validation requirements  All testing is performed with control materials showing appropriate reactivity at standard intervals      Protime-INR [[id number]]  (Normal) Collected: 01/23/21 0736    Lab Status: Final result Specimen: Blood from Arm, Right Updated: 01/23/21 0803     Protime 13 8 seconds      INR 1 05    APTT [[id number]]  (Normal) Collected: 01/23/21 0736    Lab Status: Final result Specimen: Blood from Arm, Right Updated: 01/23/21 0803     PTT 30 seconds     Troponin I [[id number]]  (Normal) Collected: 01/23/21 0736    Lab Status: Final result Specimen: Blood from Arm, Right Updated: 01/23/21 0802     Troponin I 0 02 ng/mL     Basic metabolic panel [[id number]]  (Abnormal) Collected: 01/23/21 0736    Lab Status: Final result Specimen: Blood from Arm, Right Updated: 01/23/21 0752     Sodium 141 mmol/L      Potassium 3 2 mmol/L      Chloride 106 mmol/L      CO2 27 mmol/L      ANION GAP 8 mmol/L      BUN 10 mg/dL      Creatinine 0 88 mg/dL      Glucose 124 mg/dL      Calcium 8 9 mg/dL      eGFR 65 ml/min/1 73sq m     Narrative:      [place] guidelines for Chronic Kidney Disease (CKD):     Stage 1 with normal or high GFR (GFR > 90 mL/min/1 73 square meters)    Stage 2 Mild CKD (GFR = 60-89 mL/min/1 73 square meters)    Stage 3A Moderate CKD (GFR = 45-59 mL/min/1 73 square meters)    Stage 3B Moderate CKD (GFR = 30-44 mL/min/1 73 square meters)    Stage 4 Severe CKD (GFR = 15-29 mL/min/1 73 square meters)    Stage 5 End Stage CKD (GFR <15 mL/min/1 73 square meters)  Note: GFR calculation is accurate only with a steady state creatinine    CBC and Platelet [[id number]]  (Normal) Collected: 01/23/21 0736    Lab Status: Final result Specimen: Blood from Arm, Right Updated: 01/23/21 0743     WBC 7 39 Thousand/uL      RBC 4 51 Million/uL      Hemoglobin 13 2 g/dL      Hematocrit 41 5 %      MCV 92 fL      MCH 29 3 pg      MCHC 31 8 g/dL      RDW 13 9 %      Platelets 551 Thousands/uL      MPV 10 6 fL     Fingerstick Glucose (POCT) [[id number]]  (Normal) Collected: 01/23/21 0724    Lab Status: Final result Updated: 01/23/21 0725     POC Glucose 115 mg/dl                  CTA stroke alert (head/neck)   Final Result by Jerald Riggs DO (01/23 7084)   1  Somewhat limited examination due to patient motion artifact throughout the entire study  2   Severe, focal short segment stenosis of the proximal M1 segment of the left middle cerebral artery  3   Nonvisualization of the distal P2 segment right posterior cerebral artery  Findings suspicious for occlusion of the distal right posterior cerebral artery  4   Small bilateral pleural effusions     5   1 3 cm noncalcified nodule posterior aspect left lower lobe  Based on current Fleischner Society 2017 Guidelines on incidental pulmonary nodule, either PET/CT scan evaluation, tissue sampling or short term interval followup non-contrast CT followup    (initailly in 3 months) may be considered appropriate  Findings were directly discussed with SALOMÓN MEREDITH on 1/23/2021 7:51 AM                      Workstation performed: [id number]         CT stroke alert brain   Final Result by Africa Banuelos DO (01/23 2303)   Cerebral atrophy with chronic small vessel ischemic white matter disease and chronic infarctions              Findings were directly discussed with SALOMÓN MEREDITH on 1/23/2021 7:51 AM          Workstation performed: [id number]                    Procedures  ECG 12 Lead Documentation Only    Date/Time: 1/23/2021 9:05 AM  Performed by: Anirudh Leary DO  Authorized by: Ainrudh Leary DO     Indications / Diagnosis:  CVA sx  ECG reviewed by me, the ED Provider: yes    Patient location:  ED  Previous ECG:     Previous ECG:  Unavailable  Interpretation:     Interpretation: normal    Rate:     ECG rate:  69    ECG rate assessment: normal    Rhythm:     Rhythm: sinus rhythm    Ectopy:     Ectopy: none    QRS:     QRS axis:  Normal  Conduction:     Conduction: normal    ST segments:     ST segments:  Normal  T waves:     T waves: normal      CriticalCare Time  Performed by: Anirudh Leary DO  Authorized by: Anirudh Leary DO     Critical care provider statement:     Critical care time (minutes):  30    Critical care time was exclusive of:  Separately billable procedures and treating other patients    Critical care was necessary to treat or prevent imminent or life-threatening deterioration of the following conditions:  CNS failure or compromise    Critical care was time spent personally by me on the following activities:  Blood draw for specimens, discussions with consultants, evaluation of patient's response to treatment, examination of patient, review of old charts, re-evaluation of patient's condition, ordering and review of radiographic studies, ordering and review of laboratory studies, ordering and performing treatments and interventions, obtaining history from patient or surrogate and development of treatment plan with patient or surrogate    I assumed direction of critical care for this patient from another provider in my specialty: no               ED Course  ED Course as of Jan 23 1108   Sat Jan 23, 2021   0897 Case discussed with Dr Shan Schumacher of Neurology  He recommends decreasing patient's blood pressure and consider giving tPA for possible small-vessel cerebellar stroke  Will confirm that the patient did return to her baseline this morning when she woke up as this would restart the clock of time of onset  4563 Patient re-evaluated by me  Speech is clear  She does confirm that she woke up at 5:30 a m  And ambulated without difficulty, without symptoms  A few minutes later after having a bowel movement and cleaning out her mouth the dizziness came back and she felt off balance and had difficulty ambulating       0801 /81 after 1st dose of labetalol  0591 Discussed CT scan results with Dr Lavena Goodell of neurology - recommends TPA - will attempt to lower blood pressure so patient may be given TPA  Risk/benefit of TPA discussed with patient who agrees to treatment  1445 Patient's blood pressure continues to be elevated despite 2nd dose of labetalol  Patient has allergies to ACE inhibitors and calcium channel blockers, anaphylactic response to these medications  This limits available treatment for blood pressure  Most recent blood pressure 213/109      0851 Neurology nurse practitioner Rajiv Sinha at the bedside  Patient's blood pressure now low enough to allow for tPA to be given  Will continue to monitor closely        0901 Blood pressure stabilized, case discussed again with Dr Lavena Goodell and plan is to give tPA 1010 Patient complaining of seeing floaters in her peripheral vision  Patient describes black and white stripes and blocks  She reports having experience these frequently in the past both before and after her cataract surgery  She states that her symptoms are usually transient  Nursing also notes slight bleeding around IV insertion site distal to the blood pressure cuff  Patient is otherwise asymptomatic  She states that she feels well  Speech is clear  Strength is intact  Pupils are equal and reactive  Blood pressure now elevated, will start labetalol drip                    Stroke Assessment     Row Name 01/23/21 0722             NIH Stroke Scale    Interval  Baseline      Level of Consciousness (1a )  0      LOC Questions (1b )  0      LOC Commands (1c )  0      Best Gaze (2 )  0      Visual (3 )  0      Facial Palsy (4 )  0      Motor Arm, Left (5a )  0      Motor Arm, Right (5b )  0      Motor Leg, Left (6a )  0      Motor Leg, Right (6b )  0      Limb Ataxia (7 )  1 pt  has gait ataxia, is off balance when she attempts to walk      Sensory (8 )  0      Best Language (9 )  0      Dysarthria (10 )  0      Extinction and Inattention (11 ) (Formerly Neglect)  0      Total  1                    SBIRT 20yo+      Most Recent Value   SBIRT (25 yo +)   In order to provide better care to our patients, we are screening all of our patients for alcohol and drug use  Would it be okay to ask you these screening questions? Yes Filed at: 01/23/2021 0754   Initial Alcohol Screen: US AUDIT-C    1  How often do you have a drink containing alcohol?  0 Filed at: 01/23/2021 0754   2  How many drinks containing alcohol do you have on a typical day you are drinking? 0 Filed at: 01/23/2021 0754   3a  Male UNDER 65: How often do you have five or more drinks on one occasion? 0 Filed at: 01/23/2021 0754   3b  FEMALE Any Age, or MALE 65+: How often do you have 4 or more drinks on one occassion?   0 Filed at: 01/23/2021 9713 Audit-C Score  0 Filed at: 01/23/2021 8434   BARBARA: How many times in the past year have you    Used an illegal drug or used a prescription medication for non-medical reasons? Never Filed at: 01/23/2021 0754                    MDM  Number of Diagnoses or Management Options  CVA (cerebral vascular accident) [facility]): new and requires workup  Hypokalemia: new and requires workup     Amount and/or Complexity of Data Reviewed  Clinical lab tests: reviewed and ordered  Tests in the radiology section of CPT®: ordered and reviewed  Tests in the medicine section of CPT®: ordered and reviewed  Decide to obtain previous medical records or to obtain history from someone other than the patient: yes  Discuss the patient with other providers: yes  Independent visualization of images, tracings, or specimens: yes    Patient Progress  Patient progress: stable      Disposition  Final diagnoses:   CVA (cerebral vascular accident) ([facility] [address] )   Hypokalemia     Time reflects when diagnosis was documented in both MDM as applicable and the Disposition within this note     Time User Action Codes Description Comment    1/23/2021  9:21 AM Zoe Yates Add [I63 9] CVA (cerebral vascular accident) ([facility] [address] )     1/23/2021  9:21 AM Miesha Suggs Add [E87 6] Hypokalemia       ED Disposition     ED Disposition Condition Date/Time Comment    Admit Stable Sat Jan 23, 2021  9:21 AM Case was discussed with LORAINE Hare and the patient's admission status was agreed to be Admission Status: inpatient status to the service of Dr Juany Werner   Follow-up Information    None         Patient's Medications   Discharge Prescriptions    No medications on file     No discharge procedures on file      PDMP Review     None          ED Provider  Electronically Signed by           Amada Rojas DO  01/23/21 1108

## 2021-01-23 NOTE — STROKE DOCUMENTATION
Patient reports being on the phone last night around 2100 and noticing slurred speech, reports after getting the phone she reports dizziness, walking into walls and like she was drunk  Reports going to bed  Reports having BM around 0530 this morning and after starting with dizziness and being off balance when walking

## 2021-01-23 NOTE — CONSULTS
Neurology Consult- Lenoard Lowers 1945, 78 y o  female   MRN: [id number] Unit/Bed#: ICU 09 Encounter: [id number]      Consult to Neurology  Consult performed by: ERASMO Cook  Consult ordered by: Jamey Solano DO      Reason for Consult / Principal Problem:  Stroke alert  Hx and PE limited by:  None  Review of previous medical records was completed  * Stroke-like episode  Assessment & Plan  This patient reports she had transient symptoms last evening similar to those that she had this morning  She reports that she felt unable to walk straight she had to hold onto things to walk  She also reports that she felt fatigued  She went to bed woke up this morning at 5:30 a m  In felt as if all was normal   She was able to initially walked pressure teeth in use the bathroom this morning all normal before she had this sudden onset of the disequilibrius symptoms that she experienced last night  She was unable to walk she was unable to stand up  She felt as if she would fall over  She did not have vertigo or room spinning per se she did not have dizziness or lightheadedness  She presented to the ED with blood pressures in the range of 200  Her NIH score is low but after gating her myself she is clearly impaired  She has had multiple medication reactions by her report limiting our ability to quickly lower her blood pressure below 185 for tPA  After numerous attempts we have been able to lower her blood pressure to when   She was bolused with tPA at approximately 9:10 a m  Or 9:15 a m  Her drip is ongoing  She will be admitted to the ICU for 24 hour observation      Repeat CT of head 24 Hrs post TPA  24 hours post tpa ok for ASA if no bleed  Secondary risk factor modification - started on Lipitor and blood pressure control when cleared by speech and taking PO-   Avoid hypotension -  blood pressure goal 160 initially   BS control  MRI of brain pending  Echo pending   Aspiration precautions  DVT prevention  Neurological checks; notify neurology or critical care team with any changes  Aggressive therapies - eventually depending on course - PMR consultation      Bilateral carotid artery disease ([address] )  Assessment & Plan  She reports a history of carotid stenosis bilaterally  Her CTA done today demonstrates mild stenosis bilaterally more distal rather than proximal     Essential hypertension  Assessment & Plan  This patient reports that her hypertension is controlled at home that she is compliant with her meds  She reports that she checked her blood pressure yesterday or the day before and it was Um in the range of 100 systolic by her report  Her blood pressure has been somewhat refractory here today and she has required several doses of labetalol to get her down from 200 to 170 for the administration of her tPA  Nancy Pitts will need follow up in in 4 weeks with neurovascular attending or advance practitioner  She may not require further outpatient testing  Felipa Meza  Decision: TPA given this admission  After a discussion of risks and benefits reviewing inclusion and exclusion criteria the decision was made to proceed with thrombolytic therapy  Verbal consent was obtained from  the patient  NIH Stroke Scale Score:  1 a  Level of consciousness (alert, drowsy, coma)  0   1 b  LOC Question (month, age) Both, 1 correct, neither 0   1 c  LOC commands (eyes; fist) Both, 1 correct, neither 0   2  Best gaze (Tracking) Normal, Partial, gaze paresis 0  3  Visual field cuts- None, Partial neftaly, Complete neftaly, B Blind 0  4  Facial palsy (teeth, brows and lids shut) Normal, Minor, Partial, Complete 0   5 a  Motor Arm Left  (Elevate and score 10 sec drift) None, Drift (<10 sec), Some effort (falls < 10 sec),  No effort, No movement, Unable to score 0   5 b  Motor Arm Right (Elevate and score 10 sec drift) None, Drift (<10 sec), Some effort (falls < 10 sec),  No effort, No movement, Unable to score 0   6 a  Motor Leg Left (Elevate 30 hold 5 count) None, Drift (<5 sec), Some effort (falls < 5 sec),  No effort, No movement, Unable to score 0   6 b  Motor Leg Right (Elevate 30 hold 5 count) None, Drift (<5 sec), Some effort (falls < 5 sec),  No effort, No movement, Unable to score 0   7  Limb Ataxia (FNF HTS) Absent, Present one limb, Present two limbs, 1  8  Sensory (PP face, arm, trunk, leg) Normal, Mild/mod, Severe/total 0   9  Best Language (items, picture, reads) Good, Mild/mod, Severe, Mute 0   10  Dysarthria (speech clarity) Normal, Mild/mod, Severe dysarthria 0   11  Extinction/neglect None, Partial neglect, Complete 0    Total NIHSS 1+ her gait is clearly disequilibrius compared with a she reports is her pre morbid gait  Mental Status: The patient was awake, alert, attentive, oriented to person, place, and time  Recent and remote memory intact to conversation with no evidence of language dysfunction  Satisfactory fund of knowledge  Normal attention span and concentration  Able to name, repeat, describe a complex scene  HPI: Lazaro Schaeffer is a right handed  78 y o  female who  presented to the ED for stroke alert approximately 7:30 a m  This morning  The patient reports that she had a lot of stress with her  who has cancer and is moving to hospice in the home and has been moving him to appointments lately  She reports that last evening after she had gotten him settled she reports that she felt so fatigued she noticed that her speech was even slightly slurred  She called a friend of hers and the friend also thought that she had a slightly slurred speech as did her   She also reports that she had difficulty walking she felt like she just could not stand up straight  She lay down on the couch and by her report she slept comfortably all night  She woke today at about 5:30 a m  Yangk Bella   She reports that when she awoke early this morning she felt like she was normal again and that everything from last night had resolved  She was able to get up to check on her  pressure teeth go use the bathroom etcetera  It was apparently after she used the bathroom and she was getting up that she had a rather sudden onset of disequilibrium  She felt as if she could not stand up straight  She denies that she had any little unilateral weakness  She reports that she was not lightheaded or dizzy, she did not have any blurred vision  She reports that she just felt like she could not stand up straight and that she could not walk  She presented here to the emergency room approximately 7:00 a m     Stroke alert was called around 7:30 a m  Neurology Um responded by telephone and was in communication with the ED attending concerning this patient's low NIH, her disequilibrium is presentation, and her consistently elevated blood pressures  This examiner arrived for her neurologic exam approximately 830  See the NIH score below  She continued to have markedly elevated blood pressures in the region of 190-200 despite the administration of several meds, see her record  Approximately 9:00 a m  Her blood pressure began to be somewhat controlled she was consistently at 175 or 180  She had the bolus of tPA at approximately 10 after 9 and her drip started approximately 915  I have discussed with this patient that she appears to be having an acute cerebrovascular event and that we are trying to abort her symptoms and a potential stroke with tPA  She is agreeable to proceeding with tPA at this time  She will be admitted to the ICU overnight  She is quite uncomfortable with that as her  is on hospice at home  ROS: 12 system cued query:  She denies any headache or visual disturbances  She denies any facial numbness dysesthesia weakness cognitive changes or dysarthria  She denies any chest pain palpitations  No shortness of breath    She denies any weakness is lateralizing or general   She reports that she was weak last night but she feels that it resolved after she slept all night  Subjectively she reports a very mild subtle sensation of having a dysesthesia in her left fingertips  No other recent illnesses or fevers  No GI bowel or bladder complaints  The remainder of her query was negative  Historical Information     Past Medical History:   Diagnosis Date    Anesthesia complication     Pt has side effects   from anesthesia EXCEPT propofol   Anxiety     Arthritis     knees    Carotid artery narrowing     60% blockage    Cataract     bilateral    Claustrophobia     Colon polyp     Family history of GERD     Fibromyalgia, primary     Full dentures     since age 25yr    Hearing loss     right ear    History of meningioma of the brain     Hyperlipemia     Hypertension     elevates with stress, "white coat syndrome"    Macular degeneration     small area left eye    PONV (postoperative nausea and vomiting)     with general    Wears glasses     has a prism lens     Past Surgical History:   Procedure Laterality Date    BACK SURGERY  1990    L4,5-laminectomy    BRAIN SURGERY  03/2018    benign mass-behind right ear at the base of the skull-2nd surgery to reopen to remove infection    BREAST BIOPSY Right     benign    BREAST CYST ASPIRATION Bilateral     multiple sites in the 80s    COLONOSCOPY      x5    EGD      HYSTERECTOMY  1994    complete-large ovarian cyst    LIPOMA RESECTION Left     antecubital    MAMMO NEEDLE LOCALIZATION RIGHT (ALL INC) Right 4/30/2009    MAMMO STEREOTACTIC BREAST BIOPSY RIGHT (ALL INC) Right     OOPHORECTOMY      OR XCAPSL CTRC RMVL INSJ IO LENS PROSTH W/O ECP Left 10/5/2020    Procedure: EXTRACTION EXTRACAPSULAR CATARACT PHACO INTRAOCULAR LENS (IOL);   Surgeon: Nicolasa Velázquez MD;  Location: [facility] MAIN OR;  Service: Ophthalmology    OR XCAPSL CTRC RMVL INSJ IO LENS PROSTH W/O ECP Right 11/16/2020    Procedure: EXTRACTION EXTRACAPSULAR CATARACT PHACO INTRAOCULAR LENS (IOL); Surgeon: Victoriano Campos MD;  Location: [facility] MAIN OR;  Service: Ophthalmology    TONSILLECTOMY      age 34yr       Social History :  The patient is  lives with her  in her home  He has recently been moved to home hospice care  She is a nonsmoker no drinker no recreational is  Family History:   Family History   Problem Relation Age of Onset    Heart disease Mother         MI    Hypertension Mother     Other Mother         polio    Parkinsonism Father     Kidney disease Father     Stroke Father     Stroke Sister     Hypertension Sister     Diabetes Sister         pre-diabetes    Hypertension Brother     Malig Hypertension Maternal Grandfather          Allergies   Allergen Reactions    Ace Inhibitors      Anaphylactic  Norvasc    Amlodipine Anaphylaxis     Throat closes    Amlodipine Besy-Benazepril Hcl Anaphylaxis     Throat closes    Amoxicillin-Pot Clavulanate Anaphylaxis     Abdominal cramps    Other      Medication coatings-noted on an allergy test-may have caused the throat closing and tongue swelling    Acetaminophen Hives    Aspirin Hives    Ibuprofen Hives    Nebivolol Hives    Adhesive [Medical Tape] Rash     Tolerates paper tape well      Clarithromycin Other (See Comments)     Abdominal cramps    Cortisone Other (See Comments)     Stomach cramping    Sulfa Antibiotics Other (See Comments)     Abdominal cramps     Meds:all current active meds have been reviewed    Scheduled Meds:  Current Facility-Administered Medications   Medication Dose Route Frequency Provider Last Rate    atorvastatin  80 mg Oral QPM ERASMO Renee      [START ON 1/24/2021] cloNIDine  0 2 mg Oral BID ERASMO Renee      doxazosin  2 mg Oral HS ERASMO Renee      hydrALAZINE  10 mg Intravenous Q6H PRN ERASMO Renee      labetalol (NORMODYNE) 200 mg infusion  1 mg/min Intravenous Continuous Zoe Yates DO 1 mg/min (01/23/21 1525)  Labetalol HCl  10 mg Intravenous Q4H PRN ERASMO Nolen       PRN Meds:       Physical Exam:   Objective   Vitals:Blood pressure (!) 185/79, pulse 73, temperature 98 8 °F (37 1 °C), temperature source Oral, resp  rate 22, height 5' 8" (1 727 m), weight 94 2 kg (207 lb 10 8 oz), SpO2 96 %  ,Body mass index is 31 58 kg/m²  Patient was examined in emergency department bed   General: alert, appears stated age and cooperative  Head: Normocephalic, without obvious abnormality, atraumatic  Oral exam: lips, mucosa, and tongue moist;   Neck: no carotid bruit,   Lungs: clear to auscultation ant  bilaterally  Heart: regular rate and rhythm, S1, S2 normal, no murmur appreciated,   Abdomen: soft, +BS    Extremities: atraumatic, no cyanosis or edema    Neurologic:   Mental status: Alert, somewhat anxious, hyperverbal but completely oriented, thought content appropriate  She is able to follow all commands  There is no dysarthria or aphasia  CN Exam: LINDA, EOM's I, no nystagmus VF full, Gaze conjugate No sensory or motor lateralizations (with PP on face), Hearing I B, CNIX-XII I B  Motor: full power, age appropriate x 4 limbs, she has arthritis in her hands  Sensory:  Grossly intact  X 4 limbs, mod inc lt, temp,  PP tested without asymmetry, despite reports of a subtle subjective sense of a tingling in her left fingers  Cerebellar: no past pointing or drift from modified seated Romberg position, no ataxia w upper extremity maneuvers, finger to nose was intact, however heel to shin particularly the left on the right was more clumsy than the right on the left  DTR's:  Generally diminished; Plantars:  Mute on the right, questionably subtly upgoing on the left  Gait:  This patient was able to move herself from a recumbent reclining position on the stretcher rather independently to sitting at the edge of the stretcher  When she stood she required a significantly wide-based gait    She had general sway without any lateralization required a handhold to be steadied  We attempted to gait her approximately 3 or 4 ft she clearly was disequilibrius required to significantly hold onto the examiner  There was no gross lateralizing weakness  Lab Results:   I have personally reviewed pertinent reports  , CBC:   Results from last 7 days   Lab Units 01/23/21  0736   WBC Thousand/uL 7 39   RBC Million/uL 4 51   HEMOGLOBIN g/dL 13 2   HEMATOCRIT % 41 5   MCV fL 92   PLATELETS Thousands/uL 244   , BMP/CMP:   Results from last 7 days   Lab Units 01/23/21  0736   SODIUM mmol/L 141   POTASSIUM mmol/L 3 2*   CHLORIDE mmol/L 106   CO2 mmol/L 27   BUN mg/dL 10   CREATININE mg/dL 0 88   CALCIUM mg/dL 8 9   EGFR ml/min/1 73sq m 65   , Vitamin B12:   , HgBA1C:   , TSH:   , Coagulation:   Results from last 7 days   Lab Units 01/23/21  0736   INR  1 05   , Lipid Profile:   Results from last 7 days   Lab Units 01/23/21  0736   HDL mg/dL 67   LDL CALC mg/dL 94   TRIGLYCERIDES mg/dL 75        Imaging Studies: I have personally reviewed pertinent films in PACS and I have reviewed her head CT  And is the small-vessel disease suggesting chronic atherosclerosis bilaterally  She also appears to have a left greater than right old stroke no  Her CTA is read by radiology notes:  Nonvisualization of the distal P2 segment right posterior cerebral artery  Findings suspicious for occlusion of the distal right posterior cerebral artery  Somewhat limited examination due to patient motion artifact throughout the entire study  Severe, focal short segment stenosis of the proximal M1 segment of the left middle cerebral artery  EEG, Echo, Pathology, and Other Studies: Her echo has been ordered I see no priors  Counseling / Coordination of Care  Total Critical Care time spent Greater than 50 minutes excluding procedures, teaching and family updates  Dictation voice to text software has been used in the creation of this document   Please consider this in light of any contextual or grammatical errors

## 2021-01-23 NOTE — ASSESSMENT & PLAN NOTE
She reports a history of carotid stenosis bilaterally    Her CTA done today demonstrates mild stenosis bilaterally more distal rather than proximal

## 2021-01-23 NOTE — STROKE DOCUMENTATION
Patient seeing black and white squares, has had before, Neurology MENDY Florian and  [facility] informed via tiger text

## 2021-01-23 NOTE — H&P
H&P- Theo Schwartz 1945, 78 y o  female MRN: [id number]    Unit/Bed#: ICU 09 Encounter: [id number]    Primary Care Provider: Otoniel Call   Date and time admitted to hospital: 1/23/2021  7:09 AM    * Stroke-like episode  Assessment & Plan  · NIH 1 per ED for Ataxia, no other symptoms reported  · Neuro c/s decided to give TPA  · F/u CTH in 24 hrs 1/24 @ 1000  · MRI pending- pt states she will need sedation  · Need swallow eval at bedside  · PMR, PT/ OT c/s    Hyperlipidemia  Assessment & Plan  · Started on high dose statin, place back on home dose prior to d/c    Essential hypertension  Assessment & Plan  · Restart home Cardura and Clonidine tomorrow      -------------------------------------------------------------------------------------------------------------  Chief Complaint: I feel like I'm drunk but I don't drink    History of Present Illness   Theo Schwartz is a 78 y o  female w/ pmhx of HTN and HLD who presents with ataxia  ED reports that patient reported slurred speech, ataxia and mild numbness of left hand last night  These symptoms were self limiting and resolved on their own  She reports falling asleep on the couch and when she awoke she was able to walk to the bathroom but on her return back she felt unsteady and off balance again  She denies any other symptoms and came tot he ED for evaluation  NIH reported at 1 and decision made by neuro to give TPA  History obtained from chart review and the patient   -------------------------------------------------------------------------------------------------------------  Dispo: Admit to Critical Care     Code Status: Level 1 - Full Code  --------------------------------------------------------------------------------------------------------------  Review of Systems   Constitutional: Negative  Eyes: Negative  Respiratory: Negative  Cardiovascular: Negative  Gastrointestinal: Negative  Genitourinary: Negative      Musculoskeletal: Negative  Skin: Negative  Neurological:        "I feel drunk"   Psychiatric/Behavioral: Negative  Physical Exam  Constitutional:       Appearance: Normal appearance  She is normal weight  HENT:      Head: Normocephalic and atraumatic  Eyes:      Extraocular Movements: Extraocular movements intact  Pupils: Pupils are equal, round, and reactive to light  Cardiovascular:      Rate and Rhythm: Normal rate and regular rhythm  Pulmonary:      Breath sounds: Normal breath sounds  Abdominal:      General: Abdomen is flat  There is no distension  Palpations: Abdomen is soft  Tenderness: There is no abdominal tenderness  Musculoskeletal: Normal range of motion  General: No swelling or tenderness  Skin:     General: Skin is warm and dry  Capillary Refill: Capillary refill takes less than 2 seconds  Neurological:      General: No focal deficit present  Mental Status: She is alert and oriented to person, place, and time  Mental status is at baseline  Sensory: No sensory deficit  Motor: No weakness  Psychiatric:         Mood and Affect: Mood normal          Behavior: Behavior normal        --------------------------------------------------------------------------------------------------------------  Vitals:   Vitals:    01/23/21 1300 01/23/21 1330 01/23/21 1415 01/23/21 1515   BP: (!) 184/85 (!) 196/81 (!) 195/102    BP Location:       Pulse: 73 89 87    Resp: (!) 37 (!) 43 (!) 29    Temp:    98 8 °F (37 1 °C)   TempSrc:    Oral   SpO2: 98% 97% 96%    Weight:       Height:         Temp  Min: 97 5 °F (36 4 °C)  Max: 98 9 °F (37 2 °C)  IBW: 63 9 kg  Height: 5' 8" (172 7 cm)  Body mass index is 31 58 kg/m²      Laboratory and Diagnostics:  Results from last 7 days   Lab Units 01/23/21  0736   WBC Thousand/uL 7 39   HEMOGLOBIN g/dL 13 2   HEMATOCRIT % 41 5   PLATELETS Thousands/uL 244     Results from last 7 days   Lab Units 01/23/21  0736   SODIUM mmol/L 141 POTASSIUM mmol/L 3 2*   CHLORIDE mmol/L 106   CO2 mmol/L 27   ANION GAP mmol/L 8   BUN mg/dL 10   CREATININE mg/dL 0 88   CALCIUM mg/dL 8 9   GLUCOSE RANDOM mg/dL 124          Results from last 7 days   Lab Units 01/23/21  0736   INR  1 05   PTT seconds 30      Results from last 7 days   Lab Units 01/23/21  0736   TROPONIN I ng/mL 0 02         ABG:    VBG:          Micro:        EKG: NSR  Imaging: I have personally reviewed pertinent reports  and I have personally reviewed pertinent films in PACS      Historical Information   Past Medical History:   Diagnosis Date    Anesthesia complication     Pt has side effects   from anesthesia EXCEPT propofol      Anxiety     Arthritis     knees    Carotid artery narrowing     60% blockage    Cataract     bilateral    Claustrophobia     Colon polyp     Family history of GERD     Fibromyalgia, primary     Full dentures     since age 25yr    Hearing loss     right ear    History of meningioma of the brain     Hyperlipemia     Hypertension     elevates with stress, "white coat syndrome"    Macular degeneration     small area left eye    PONV (postoperative nausea and vomiting)     with general    Wears glasses     has a prism lens     Past Surgical History:   Procedure Laterality Date    BACK SURGERY  1990    L4,5-laminectomy    BRAIN SURGERY  03/2018    benign mass-behind right ear at the base of the skull-2nd surgery to reopen to remove infection    BREAST BIOPSY Right     benign    BREAST CYST ASPIRATION Bilateral     multiple sites in the 80s    COLONOSCOPY      x5    EGD      HYSTERECTOMY  1994    complete-large ovarian cyst    LIPOMA RESECTION Left     antecubital    MAMMO NEEDLE LOCALIZATION RIGHT (ALL INC) Right 4/30/2009    MAMMO STEREOTACTIC BREAST BIOPSY RIGHT (ALL INC) Right     OOPHORECTOMY      DC XCAPSL CTRC RMVL INSJ IO LENS PROSTH W/O ECP Left 10/5/2020    Procedure: EXTRACTION EXTRACAPSULAR CATARACT PHACO INTRAOCULAR LENS (IOL); Surgeon: Zoe Gasca MD;  Location: [facility] MAIN OR;  Service: Ophthalmology    NC XCAPSL CTRC RMVL INSJ IO LENS PROSTH W/O ECP Right 2020    Procedure: EXTRACTION EXTRACAPSULAR CATARACT PHACO INTRAOCULAR LENS (IOL); Surgeon: Zoe Gasca MD;  Location: [facility] MAIN OR;  Service: Ophthalmology    TONSILLECTOMY      age 34yr     Social History   Social History     Substance and Sexual Activity   Alcohol Use Never    Frequency: Never     Social History     Substance and Sexual Activity   Drug Use Never     Social History     Tobacco Use   Smoking Status Former Smoker    Quit date:     Years since quittin 0   Smokeless Tobacco Never Used     Exercise History: Ambulatory  Family History:   Family History   Problem Relation Age of Onset    Heart disease Mother         MI    Hypertension Mother     Other Mother         polio    Parkinsonism Father     Kidney disease Father     Stroke Father     Stroke Sister     Hypertension Sister     Diabetes Sister         pre-diabetes    Hypertension Brother     Malig Hypertension Maternal Grandfather      I have reviewed this patient's family history and commented on sigificant items within the HPI      Medications:  Current Facility-Administered Medications   Medication Dose Route Frequency    atorvastatin (LIPITOR) tablet 80 mg  80 mg Oral QPM    [START ON 2021] cloNIDine (CATAPRES) tablet 0 2 mg  0 2 mg Oral BID    doxazosin (CARDURA) tablet 2 mg  2 mg Oral HS    hydrALAZINE (APRESOLINE) injection 10 mg  10 mg Intravenous Q6H PRN    labetalol (NORMODYNE) 200 mg in sodium chloride 0 9 % 200 mL infusion  1 mg/min Intravenous Continuous    Labetalol HCl (NORMODYNE) injection 10 mg  10 mg Intravenous Q4H PRN     Home medications:  Prior to Admission Medications   Prescriptions Last Dose Informant Patient Reported? Taking?    Multiple Vitamins-Minerals (PRESERVISION AREDS 2 PO)   Yes Yes   Sig: Take by mouth 2 (two) times a day   aspirin (ECOTRIN LOW STRENGTH) 81 mg EC tablet   Yes Yes   Sig: Take by mouth every evening   ciprofloxacin (CILOXAN) 0 3 % ophthalmic solution Not Taking at Unknown time  No No   Sig: Administer 1 drop to the right eye 4 (four) times a day   Patient not taking: Reported on 1/23/2021   cloNIDine (CATAPRES) 0 2 mg tablet   Yes Yes   Sig: Take 0 2 mg by mouth 2 (two) times a day 1 1/2 tabs BID   doxazosin (CARDURA) 2 mg tablet   Yes Yes   Sig: Take 2 mg by mouth daily at bedtime   ketorolac (ACULAR) 0 5 % ophthalmic solution Not Taking at Unknown time  No No   Sig: Administer 1 drop to the right eye 4 (four) times a day   Patient not taking: Reported on 1/23/2021   vitamin E, tocopherol, 400 units capsule   Yes Yes   Sig: Take 400 Units by mouth daily with dinner      Facility-Administered Medications: None     Allergies: Allergies   Allergen Reactions    Ace Inhibitors      Anaphylactic  Norvasc    Amlodipine Anaphylaxis     Throat closes    Amlodipine Besy-Benazepril Hcl Anaphylaxis     Throat closes    Amoxicillin-Pot Clavulanate Anaphylaxis     Abdominal cramps    Other      Medication coatings-noted on an allergy test-may have caused the throat closing and tongue swelling    Acetaminophen Hives    Aspirin Hives    Ibuprofen Hives    Nebivolol Hives    Adhesive [Medical Tape] Rash     Tolerates paper tape well      Clarithromycin Other (See Comments)     Abdominal cramps    Cortisone Other (See Comments)     Stomach cramping    Sulfa Antibiotics Other (See Comments)     Abdominal cramps       ------------------------------------------------------------------------------------------------------------  Advance Directive and Living Will:      Power of :    POLST:    ------------------------------------------------------------------------------------------------------------  Anticipated Length of Stay is > 2 midnights    Care Time Delivered:   No Critical Care time spent       Jeremy Canchola, CRNP

## 2021-01-23 NOTE — STROKE DOCUMENTATION
Patient reports left hand thumb and 2nd finger feeling different, no sensation loss, numbness, tingling

## 2021-01-23 NOTE — ED NOTES
Assumed care of patient, RN April G at bedside, patient already evaluated by Dr Dwayne Camejo, RN  01/23/21 0897
Neuro at bedside        Grace Steele  01/23/21 0527
Teresa Funk  01/23/21 0831
none

## 2021-01-23 NOTE — ASSESSMENT & PLAN NOTE
This patient reports that her hypertension is controlled at home that she is compliant with her meds  She reports that she checked her blood pressure yesterday or the day before and it was Um in the range of 755 systolic by her report  Her blood pressure has been somewhat refractory here today and she has required several doses of labetalol to get her down from 200 to 170 for the administration of her tPA

## 2021-01-23 NOTE — ASSESSMENT & PLAN NOTE
· NIH 1 per ED for Ataxia, no other symptoms reported  · Neuro c/s decided to give TPA  · F/u CTH in 24 hrs 1/24 @ 1000  · MRI pending- pt states she will need sedation  · Need swallow eval at bedside  · PMR, PT/ OT c/s

## 2021-01-23 NOTE — ASSESSMENT & PLAN NOTE
This patient reports she had transient symptoms last evening similar to those that she had this morning  She reports that she felt unable to walk straight she had to hold onto things to walk  She also reports that she felt fatigued  She went to bed woke up this morning at 5:30 a m  In felt as if all was normal   She was able to initially walked pressure teeth in use the bathroom this morning all normal before she had this sudden onset of the disequilibrius symptoms that she experienced last night  She was unable to walk she was unable to stand up  She felt as if she would fall over  She did not have vertigo or room spinning per se she did not have dizziness or lightheadedness  She presented to the ED with blood pressures in the range of 200  Her NIH score is low but after gating her myself she is clearly impaired  She has had multiple medication reactions by her report limiting our ability to quickly lower her blood pressure below 185 for tPA  After numerous attempts we have been able to lower her blood pressure to when   She was bolused with tPA at approximately 9:10 a m  Or 9:15 a m  Her drip is ongoing  She will be admitted to the ICU for 24 hour observation      Repeat CT of head 24 Hrs post TPA  24 hours post tpa ok for ASA if no bleed  Secondary risk factor modification - started on Lipitor and blood pressure control when cleared by speech and taking PO-   Avoid hypotension -  blood pressure goal 160 initially   BS control  MRI of brain pending  Echo pending   Aspiration precautions  DVT prevention  Neurological checks; notify neurology or critical care team with any changes  Aggressive therapies - eventually depending on course - PMR consultation

## 2021-01-23 NOTE — STROKE DOCUMENTATION
Patient's left arm IV site, small amount of bleeding and bruising noted around insertion site,  [facility] aware

## 2021-01-24 ENCOUNTER — APPOINTMENT (INPATIENT)
Dept: NON INVASIVE DIAGNOSTICS | Facility: HOSPITAL | Age: 76
DRG: 062 | End: 2021-01-24
Payer: MEDICARE

## 2021-01-24 ENCOUNTER — APPOINTMENT (INPATIENT)
Dept: MRI IMAGING | Facility: HOSPITAL | Age: 76
DRG: 062 | End: 2021-01-24
Payer: MEDICARE

## 2021-01-24 ENCOUNTER — APPOINTMENT (INPATIENT)
Dept: CT IMAGING | Facility: HOSPITAL | Age: 76
DRG: 062 | End: 2021-01-24
Payer: MEDICARE

## 2021-01-24 PROBLEM — Z92.82 RECEIVED INTRAVENOUS TISSUE PLASMINOGEN ACTIVATOR (TPA) IN EMERGENCY DEPARTMENT: Status: ACTIVE | Noted: 2021-01-24

## 2021-01-24 LAB
ANION GAP SERPL CALCULATED.3IONS-SCNC: 8 MMOL/L (ref 4–13)
BASOPHILS # BLD AUTO: 0.06 THOUSANDS/ΜL (ref 0–0.1)
BASOPHILS NFR BLD AUTO: 1 % (ref 0–1)
BUN SERPL-MCNC: 11 MG/DL (ref 5–25)
CALCIUM SERPL-MCNC: 8.8 MG/DL (ref 8.3–10.1)
CHLORIDE SERPL-SCNC: 106 MMOL/L (ref 100–108)
CO2 SERPL-SCNC: 27 MMOL/L (ref 21–32)
CREAT SERPL-MCNC: 1 MG/DL (ref 0.6–1.3)
EOSINOPHIL # BLD AUTO: 0.15 THOUSAND/ΜL (ref 0–0.61)
EOSINOPHIL NFR BLD AUTO: 2 % (ref 0–6)
ERYTHROCYTE [DISTWIDTH] IN BLOOD BY AUTOMATED COUNT: 14.6 % (ref 11.6–15.1)
GFR SERPL CREATININE-BSD FRML MDRD: 56 ML/MIN/1.73SQ M
GLUCOSE SERPL-MCNC: 96 MG/DL (ref 65–140)
HCT VFR BLD AUTO: 41.6 % (ref 34.8–46.1)
HGB BLD-MCNC: 13.2 G/DL (ref 11.5–15.4)
IMM GRANULOCYTES # BLD AUTO: 0.05 THOUSAND/UL (ref 0–0.2)
IMM GRANULOCYTES NFR BLD AUTO: 1 % (ref 0–2)
LYMPHOCYTES # BLD AUTO: 1.41 THOUSANDS/ΜL (ref 0.6–4.47)
LYMPHOCYTES NFR BLD AUTO: 16 % (ref 14–44)
MAGNESIUM SERPL-MCNC: 2 MG/DL (ref 1.6–2.6)
MCH RBC QN AUTO: 29.3 PG (ref 26.8–34.3)
MCHC RBC AUTO-ENTMCNC: 31.7 G/DL (ref 31.4–37.4)
MCV RBC AUTO: 92 FL (ref 82–98)
MONOCYTES # BLD AUTO: 0.63 THOUSAND/ΜL (ref 0.17–1.22)
MONOCYTES NFR BLD AUTO: 7 % (ref 4–12)
NEUTROPHILS # BLD AUTO: 6.54 THOUSANDS/ΜL (ref 1.85–7.62)
NEUTS SEG NFR BLD AUTO: 73 % (ref 43–75)
NRBC BLD AUTO-RTO: 0 /100 WBCS
PHOSPHATE SERPL-MCNC: 2.9 MG/DL (ref 2.3–4.1)
PLATELET # BLD AUTO: 243 THOUSANDS/UL (ref 149–390)
PMV BLD AUTO: 10.8 FL (ref 8.9–12.7)
POTASSIUM SERPL-SCNC: 3.6 MMOL/L (ref 3.5–5.3)
RBC # BLD AUTO: 4.51 MILLION/UL (ref 3.81–5.12)
SODIUM SERPL-SCNC: 141 MMOL/L (ref 136–145)
WBC # BLD AUTO: 8.84 THOUSAND/UL (ref 4.31–10.16)

## 2021-01-24 PROCEDURE — 93306 TTE W/DOPPLER COMPLETE: CPT

## 2021-01-24 PROCEDURE — G1004 CDSM NDSC: HCPCS

## 2021-01-24 PROCEDURE — 85025 COMPLETE CBC W/AUTO DIFF WBC: CPT | Performed by: NURSE PRACTITIONER

## 2021-01-24 PROCEDURE — 84100 ASSAY OF PHOSPHORUS: CPT | Performed by: NURSE PRACTITIONER

## 2021-01-24 PROCEDURE — 97163 PT EVAL HIGH COMPLEX 45 MIN: CPT

## 2021-01-24 PROCEDURE — 70450 CT HEAD/BRAIN W/O DYE: CPT

## 2021-01-24 PROCEDURE — 83735 ASSAY OF MAGNESIUM: CPT | Performed by: NURSE PRACTITIONER

## 2021-01-24 PROCEDURE — 93306 TTE W/DOPPLER COMPLETE: CPT | Performed by: INTERNAL MEDICINE

## 2021-01-24 PROCEDURE — 70553 MRI BRAIN STEM W/O & W/DYE: CPT

## 2021-01-24 PROCEDURE — 99233 SBSQ HOSP IP/OBS HIGH 50: CPT | Performed by: PSYCHIATRY & NEUROLOGY

## 2021-01-24 PROCEDURE — 99232 SBSQ HOSP IP/OBS MODERATE 35: CPT | Performed by: INTERNAL MEDICINE

## 2021-01-24 PROCEDURE — 80048 BASIC METABOLIC PNL TOTAL CA: CPT | Performed by: NURSE PRACTITIONER

## 2021-01-24 PROCEDURE — A9585 GADOBUTROL INJECTION: HCPCS | Performed by: NURSE PRACTITIONER

## 2021-01-24 RX ORDER — MIDAZOLAM HYDROCHLORIDE 2 MG/2ML
2 INJECTION, SOLUTION INTRAMUSCULAR; INTRAVENOUS DAILY PRN
Status: DISCONTINUED | OUTPATIENT
Start: 2021-01-24 | End: 2021-01-24

## 2021-01-24 RX ORDER — MIDAZOLAM HYDROCHLORIDE 2 MG/2ML
2 INJECTION, SOLUTION INTRAMUSCULAR; INTRAVENOUS ONCE
Status: COMPLETED | OUTPATIENT
Start: 2021-01-24 | End: 2021-01-24

## 2021-01-24 RX ORDER — ATORVASTATIN CALCIUM 40 MG/1
40 TABLET, FILM COATED ORAL
Status: DISCONTINUED | OUTPATIENT
Start: 2021-01-24 | End: 2021-01-24

## 2021-01-24 RX ORDER — FENTANYL CITRATE 50 UG/ML
50 INJECTION, SOLUTION INTRAMUSCULAR; INTRAVENOUS ONCE
Status: COMPLETED | OUTPATIENT
Start: 2021-01-24 | End: 2021-01-24

## 2021-01-24 RX ORDER — FENTANYL CITRATE 50 UG/ML
50 INJECTION, SOLUTION INTRAMUSCULAR; INTRAVENOUS DAILY PRN
Status: DISCONTINUED | OUTPATIENT
Start: 2021-01-24 | End: 2021-01-24

## 2021-01-24 RX ADMIN — FENTANYL CITRATE 50 MCG: 50 INJECTION, SOLUTION INTRAMUSCULAR; INTRAVENOUS at 13:45

## 2021-01-24 RX ADMIN — MIDAZOLAM HYDROCHLORIDE 1 MG: 1 INJECTION, SOLUTION INTRAMUSCULAR; INTRAVENOUS at 13:45

## 2021-01-24 RX ADMIN — CLONIDINE HYDROCHLORIDE 0.2 MG: 0.1 TABLET ORAL at 08:15

## 2021-01-24 RX ADMIN — DOXAZOSIN 2 MG: 1 TABLET ORAL at 22:30

## 2021-01-24 RX ADMIN — CLONIDINE HYDROCHLORIDE 0.2 MG: 0.1 TABLET ORAL at 18:05

## 2021-01-24 RX ADMIN — GADOBUTROL 9 ML: 604.72 INJECTION INTRAVENOUS at 14:43

## 2021-01-24 RX ADMIN — MIDAZOLAM HYDROCHLORIDE 2 MG: 1 INJECTION, SOLUTION INTRAMUSCULAR; INTRAVENOUS at 13:45

## 2021-01-24 NOTE — PROGRESS NOTES
Neurology Progress Note - Lyudmila Perez 1945, 78 y o  female   MRN: [id number] Unit/Bed#: ICU 09 Encounter: [id number]        * Stroke-like episode  Assessment & Plan  Concerning this patient's cerebral vascular event, she reports she had transient symptoms the evening prior to admission, the 22nd, similar to those that she had on the morning of the 23rd which is what actually triggered her presentation  Her initial complaints after waking normal on the 23rd, were reports that she felt unable to walk straight she had to hold onto things to walk, she had this sudden onset of the disequilibrius symptoms  She presented to the ED with blood pressures in the range of 200  Her NIH score is low but after gating her myself she is clearly impaired  She has had multiple medication reactions by her report limiting our ability to quickly lower her blood pressure below 185 for tPA  She was given tPA and has been in ICU overnight without adverse events  Exam today demonstrates to improve disequilibrium with exam   Her heel to shin, left on right, is improved and she is able to stand unassisted without the wide base that she had yesterday and she had none of the sway, or retropulsion that she had yesterday  I was able to gait her with a handhold just within the space of the room she did much better  24 H repeat CT of head appears stable c/w admission scan  ok for ASA @ 24 H if no bleed  MRI of brain pending, simply because of her head reports of claustrophobia she may not be able to have an MRI of the brain   - long d/w patient re Lipitor reason and benefit   - Begin BP normalization   Avoid hypotension -    BS control  Echo report pending   Aspiration precautions  DVT prevention  Secondary risk factor modification   Neurological checks; notify neurology or critical care team with any changes  She may not need inpatient rehab and simply may benefit from outpatient PT at discharge        Received intravenous tissue plasminogen activator (tPA) in emergency department  Assessment & Plan  At the time of presentation yesterday this patient was clearly disequilibrium is  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  Despite her reporting that she had symptoms the night before she reports that when she went to bed and then woke up yesterday morning at 5:30 a m  She initially woke up normal in her symptoms started again a few minutes after that  Because of that are tPA clock yesterday was 5:30 a m     She presented to hospital had such refractory hypertension we were still able to administer tPA however was approximately 3 hours and 35 minutes as opposed to within the 3 hour window that we would have preferred  Overnight she has tolerated ICU monitoring well  She is still within the window of 24 hour post tPA monitoring  We have not yet obtained her labs  We have gotten her CT head as noted above  Hawa Solorzano will need follow up in in 4 weeks with neurovascular attending or advance practitioner  She will not require outpatient neurological testing  Subjective/Objective     Subjective:  I feel perfectly fine today how long to after stay here  My  is sick at home  ROS:  The patient expresses her worrying concern for her  who apparently his cancer and is on hospice at home  Although she may be minimizing her complaints she does appear to be well  She denies any complaints of headache or visual disturbance  She denies any lateralizing weaknesses numbness or tingling  No chest pain no shortness of breath  She reports that she feels as if she is walking almost like normal today  The staff nurse reports no adverse events      Medication Dose Route Frequency    cloNIDine  0 2 mg Oral BID    doxazosin  2 mg Oral HS    fentanyl citrate (PF)  50 mcg Intravenous Once    fentanyl citrate (PF)  50 mcg Intravenous Daily PRN    hydrALAZINE  10 mg Intravenous Q6H PRN    labetalol (NORMODYNE) 200 mg infusion  1 mg/min Intravenous Continuous    Labetalol HCl  10 mg Intravenous Q4H PRN    midazolam  2 mg Intravenous Once    midazolam  2 mg Intravenous Daily PRN     fentanyl citrate (PF)    hydrALAZINE    Labetalol HCl    midazolam    Vitals: Blood pressure 166/86, pulse 71, temperature 97 9 °F (36 6 °C), temperature source Oral, resp  rate (!) 29, height 5' 8" (1 727 m), weight 91 4 kg (201 lb 8 oz), SpO2 96 %  ,Body mass index is 30 64 kg/m²  Physical Exam:     Sukh Barreto seen in:  Out of bed in the chair in the ICU  She is on the phone talking  General appearance: alert, and chatty  Neck, Lungs, Heart, & abdomen: WNL  Extremities: atraumatic, no cyanosis or edema    Neurologic:   Mental status: Alert, oriented times for, thought content appropriate, she is anxious to know if she had a stroke  There is no dysarthria or aphasia  She is cognitively intact  CN:  Exam is good cranial nerves, symmetrical  Motor: full power age appropriate x 4 limbs  Sensory: grossly intact  X 4 limbs, PP not tested  Cerebellar:  Although her bilateral finger to nose was generally pretty good yesterday, and again today, her heel to shin, left on right, was somewhat clumsy today it appears nearly symmetrical with her right  Gait:  She was able to transition independently from a seated to a standing position  Her gait was not particularly wide  Although she has external rotation of her feet bilaterally they were placed in a generally what would be regarded as a normal position and she was able to hold her balance  With a tight traditional stance of Romberg and arms pronated eyes closed initially she had very little sway  After about a minute she did have begin to have more sway and it was controlled  I walked her initially with a walker and then with just a handhold and she actually did really quite well  She was able to reverse a for a few steps as well  Her exam today was much improved compared with yesterday  Lab Results: I have personally reviewed pertinent reports  And her labs for today are pending post 24 hour hold on blood draws  Imaging Studies: I have personally reviewed pertinent films in PACS and Her CT scan on my review is stable compared with her exam done yesterday  We will trying get her MRI later  She will need both fentanyl and midazolam     EEG, Echo, Pathology, and Other Studies: Echo report is pending  Counseling / Coordination of Care  Total time spent today Greater than 35 minutes  Greater than 50% of total time was spent with the patient and / or family counseling and / or coordination of care  A description of the counseling / coordination of care: All of the above was discussed and reviewed with the patient the bedside  She had several concerns and questions, concerning imaging, concerning her meds, concerning her discharge, concerning follow-up care etcetera all were reviewed and discussed with he within the limits of her workup thus far

## 2021-01-24 NOTE — PLAN OF CARE
Problem: PHYSICAL THERAPY ADULT  Goal: Performs mobility at highest level of function for planned discharge setting  See evaluation for individualized goals  Description: Treatment/Interventions: Functional transfer training, LE strengthening/ROM, Elevations, Therapeutic exercise, Endurance training, Cognitive reorientation, Gait training, Bed mobility, Equipment eval/education, Patient/family training, Spoke to nursing, Spoke to advanced practitioner  Equipment Recommended: (likely cane PRN)       See flowsheet documentation for full assessment, interventions and recommendations  Note: Prognosis: Good  Problem List: Decreased strength, Decreased endurance, Impaired balance, Decreased mobility, Decreased cognition, Impaired judgement, Decreased coordination, Impaired hearing, Obesity(gait deviations)  Assessment: Pt is a 78 y o  female seen for PT evaluation s/p admit to [facility] on 1/23/2021 w/ Stroke-like episode  Order placed for PT  Upon evaluation: Pt requires S assistance for transfers and intermittent min assistance for ambulation with out device  Pt's clinical presentation is currently unstable/unpredictable given the functional mobility deficits above, especially (but not limited to) decreased endurance, gait deviations and decreased functional mobility tolerance, coupled with fall risks including slower than anticipated gait speed, worse than cut off for 4 point DGI,  impaired balance, impaired coordination and ? cognition, and combined with medical complications of hypertension  and abnormal potassium values  Pt to benefit from continued skilled PT tx while in hospital and upon DC to address deficits as defined above and maximize level of functional mobility  Recommend trial with cane next 1-2 sessions and higher level balance activities, stairs      Barriers to Discharge: Decreased caregiver support, Inaccessible home environment(+ pt is primary caregiver to her spouse now on hospice) PT Discharge Recommendation: Return to previous environment with social support(and potentially more assistance for her/spouse @ home)          See flowsheet documentation for full assessment

## 2021-01-24 NOTE — PROGRESS NOTES
Progress Note - Jaleesa Hdez 1945, 78 y o  female MRN: [id number]    Unit/Bed#: ICU 09 Encounter: [id number]    Primary Care Provider: Maegan Millan   Date and time admitted to hospital: 1/23/2021  7:09 AM    * Stroke-like episode  Assessment & Plan  · NIH 1 in ED for Ataxia, no other symptoms reported  · Neuro c/s decided to give TPA 1/23 0900  · F/u CTH in 24 hrs today @ 1000  · MRI pending- pt states she will need sedation  · Passed swallow eval with nursing Speech c/s pending  · PMR, PT/ OT c/s  · Patient reusing Lipitor  · Mainttain -180 will clarify with neuro if can back down the BP to 140-160    Bilateral carotid artery disease ([facility] [address] )  Assessment & Plan  · Known carotid stenosis/ occlusion and patient has not had intervention    Hyperlipidemia  Assessment & Plan  · Attempted to order statin but patient refusing    Essential hypertension  Assessment & Plan  · Restart home Cardura and Clonidine today  · PRN Labetolol and Hydralazine for elevated BP      ----------------------------------------------------------------------------------------  HPI/24hr events: No events overnight; patient states she feel "great" today but has not been OOB yet  She states she is tolerating diet; denies any headaches or dizziness    Disposition: Transfer to Stepdown Level 1   Code Status: Level 1 - Full Code  ---------------------------------------------------------------------------------------  SUBJECTIVE    Review of Systems   Constitutional: Negative  HENT: Negative  Eyes: Negative  Respiratory: Negative  Cardiovascular: Negative  Gastrointestinal: Negative  Genitourinary: Negative  Musculoskeletal: Negative  Skin: Negative  Neurological: Negative  Psychiatric/Behavioral: Negative      All other systems reviewed and are negative       ---------------------------------------------------------------------------------------  OBJECTIVE    Vitals   Vitals:    01/24/21 3046 01/24/21 0578 21 0700 21 0815   BP: 163/95 (!) 183/77 (!) 176/72 (!) 177/78   BP Location: Right arm Right arm Right arm    Pulse: 69 63 64 61   Resp: (!) 34 15 14 20   Temp:   97 9 °F (36 6 °C)    TempSrc:   Oral    SpO2: 96% 96% 96% 95%   Weight:       Height:         Temp (24hrs), Av 5 °F (36 9 °C), Min:97 9 °F (36 6 °C), Max:99 °F (37 2 °C)  Current: Temperature: 97 9 °F (36 6 °C)          Respiratory:  SpO2: SpO2: 95 %, SpO2 Device: O2 Device: None (Room air)       Invasive/non-invasive ventilation settings   Respiratory    Lab Data (Last 4 hours)    None         O2/Vent Data (Last 4 hours)    None                Physical Exam    Laboratory and Diagnostics:  Results from last 7 days   Lab Units 21  0736   WBC Thousand/uL 7 39   HEMOGLOBIN g/dL 13 2   HEMATOCRIT % 41 5   PLATELETS Thousands/uL 244     Results from last 7 days   Lab Units 21  0736   SODIUM mmol/L 141   POTASSIUM mmol/L 3 2*   CHLORIDE mmol/L 106   CO2 mmol/L 27   ANION GAP mmol/L 8   BUN mg/dL 10   CREATININE mg/dL 0 88   CALCIUM mg/dL 8 9   GLUCOSE RANDOM mg/dL 124          Results from last 7 days   Lab Units 21  0736   INR  1 05   PTT seconds 30      Results from last 7 days   Lab Units 21  0736   TROPONIN I ng/mL 0 02         ABG:    VBG:          Micro        EKG: NSR  Imaging: I have personally reviewed pertinent films in PACS    Intake and Output  I/O       701 -  07 -  07 -  0700    I V  (mL/kg)  400 (4 4)     IV Piggyback  76 3     Total Intake(mL/kg)  476 3 (5 2)     Urine (mL/kg/hr)  250     Stool  0     Total Output  250     Net  +226 3            Unmeasured Urine Occurrence  1 x     Unmeasured Stool Occurrence  2 x           Height and Weights   Height: 5' 8" (172 7 cm)  IBW: 63 9 kg  Body mass index is 30 64 kg/m²    Weight (last 2 days)     Date/Time   Weight    21 0600   91 4 (201 5)    21 0710   94 2 (207 67)                Nutrition       Diet Orders   (From admission, onward)             Start     Ordered    01/23/21 1213  Diet Cardiovascular; Cardiac  Diet effective now     Question Answer Comment   Diet Type Cardiovascular    Cardiac Cardiac    RD to adjust diet per protocol?  Yes        01/23/21 1224    01/23/21 1129  Room Service  Once     Question:  Type of Service  Answer:  Room Service - Appropriate with Assistance    01/23/21 1128                  Active Medications  Scheduled Meds:  Current Facility-Administered Medications   Medication Dose Route Frequency Provider Last Rate    cloNIDine  0 2 mg Oral BID ERASMO Bustos      doxazosin  2 mg Oral HS ERASMO Bustos      hydrALAZINE  10 mg Intravenous Q6H PRN ERASMO Bustos      labetalol (NORMODYNE) 200 mg infusion  1 mg/min Intravenous Continuous Zoe Yates DO Stopped (01/24/21 0132)    Labetalol HCl  10 mg Intravenous Q4H PRN ERASMO Bustos       Continuous Infusions:  labetalol (NORMODYNE) 200 mg infusion, 1 mg/min, Last Rate: Stopped (01/24/21 0132)      PRN Meds:   hydrALAZINE, 10 mg, Q6H PRN  Labetalol HCl, 10 mg, Q4H PRN        Invasive Devices Review  Invasive Devices     Peripheral Intravenous Line            Peripheral IV 01/23/21 Left Antecubital 1 day    Peripheral IV 01/23/21 Right Antecubital 1 day          Drain            External Urinary Catheter 1 day                Rationale for remaining devices: N/A  ---------------------------------------------------------------------------------------  Advance Directive and Living Will:      Power of :    POLST:    ---------------------------------------------------------------------------------------  Care Time Delivered:   No Critical Care time spent       ERASMO Bustos

## 2021-01-24 NOTE — PHYSICAL THERAPY NOTE
PHYSICAL THERAPY EVALUATION  NAME:  Eileen Lutz  DATE: 01/24/21    AGE:   78 y o  Mrn:   [id number]  ADMIT DX:  Hypokalemia [E87 6]  CVA (cerebral vascular accident) ([facility] [address] ) [I63 9]    Past Medical History:   Diagnosis Date    Anesthesia complication     Pt has side effects   from anesthesia EXCEPT propofol   Anxiety     Arthritis     knees    Carotid artery narrowing     60% blockage    Cataract     bilateral    Claustrophobia     Colon polyp     Family history of GERD     Fibromyalgia, primary     Full dentures     since age 25yr    Hearing loss     right ear    History of meningioma of the brain     Hyperlipemia     Hypertension     elevates with stress, "white coat syndrome"    Macular degeneration     small area left eye    PONV (postoperative nausea and vomiting)     with general    Wears glasses     has a prism lens     Past Surgical History:   Procedure Laterality Date    BACK SURGERY  1990    L4,5-laminectomy    BRAIN SURGERY  03/2018    benign mass-behind right ear at the base of the skull-2nd surgery to reopen to remove infection    BREAST BIOPSY Right     benign    BREAST CYST ASPIRATION Bilateral     multiple sites in the 80s    COLONOSCOPY      x5    EGD      HYSTERECTOMY  1994    complete-large ovarian cyst    LIPOMA RESECTION Left     antecubital    MAMMO NEEDLE LOCALIZATION RIGHT (ALL INC) Right 4/30/2009    MAMMO STEREOTACTIC BREAST BIOPSY RIGHT (ALL INC) Right     OOPHORECTOMY      UT XCAPSL CTRC RMVL INSJ IO LENS PROSTH W/O ECP Left 10/5/2020    Procedure: EXTRACTION EXTRACAPSULAR CATARACT PHACO INTRAOCULAR LENS (IOL); Surgeon: Steffi Triplett MD;  Location[facility] MAIN OR;  Service: Ophthalmology    UT XCAPSL CTRC RMVL INSJ IO LENS PROSTH W/O ECP Right 11/16/2020    Procedure: EXTRACTION EXTRACAPSULAR CATARACT PHACO INTRAOCULAR LENS (IOL);   Surgeon: Steffi Triplett MD;  Location: [facility] MAIN OR;  Service: Ophthalmology    TONSILLECTOMY      age 34yr Length Of Stay: 1  Performed at least 2 patient identifiers during session: Name and Birthday  PHYSICAL THERAPY EVALUATION :    01/24/21 1300   PT Last Visit   PT Visit Date 01/24/21   Note Type   Note type Evaluation   Pain Assessment   Pain Assessment Tool Pain Assessment not indicated - pt denies pain   Home Living   Type of [address] Two level  (YOSVANY w/ rails, flight of second floor bed)   Home Equipment   (none personally)   Additional Comments Pt reports that her spouse was just put on [facility] hospice on Friday, son is currently staying with him now but pt was primary caregiver to assist him with ADL's, but did not provide physical A for mobility (except w/ stairs)      Prior Function   Level of [place] Independent with ADLs and functional mobility   Lives With Spouse  (was using cane, now uses walker)   Receives Help From Family   ADL Assistance Independent   IADLs Independent   Falls in the last 6 months 0   Restrictions/Precautions   Weight Bearing Precautions Per Order No   Other Precautions Telemetry;Multiple lines;Hard of hearing  (reports only 20% hearing R ear)   General   Additional Pertinent History +tPA   Family/Caregiver Present No   Cognition   Overall Cognitive Status WFL   Arousal/Participation Alert   Orientation Level Oriented X4   Memory   (? recall to events)   Following Commands Follows one step commands with increased time or repetition   Comments However in speaking to Twylla Sensing from Neuro, ? recall of all information during this admission   RUE Strength   RUE Overall Strength Within Functional Limits - able to perform ADL tasks with strength   LUE Strength   LUE Overall Strength Within Functional Limits - able to perform ADL tasks with strength   Strength RLE   R Hip Flexion 4+/5   R Knee Extension 4+/5   R Ankle Dorsiflexion 4/5   Strength LLE   L Hip Flexion 4/5   L Knee Extension 4/5   L Ankle Dorsiflexion 4/5   Coordination   Movements are Fluid and Coordinated 1  (heel-shin; but mild ataxia w/gait on higher level activity)   Sensation   (+ [place] R ear, WFL vision + glasses)   Light Touch   RLE Light Touch Grossly intact  (as per pt)   LLE Light Touch Grossly intact  (as per pt)   Bed Mobility   Supine to Sit   (as pt OOB upon entering room)   Transfers   Sit to Stand 5  Supervision   Additional items Armrests; Increased time required   Stand to Sit 5  Supervision   Additional items Armrests; Increased time required   Additional Comments unable to complete 5xSTS w/o UE support  Performed 5xSTS w/UE support in 15 seconds   Ambulation/Elevation   Gait pattern Excessively slow; Inconsistent latosha  (B LE ER noted)   Gait Assistance 5  Supervision  (within room;  min A in halls in open areas 50% of time)   Additional items Verbal cues   Assistive Device None   Distance 20' TUG, 250' in halls  (comfortable gait speed w/o DME   59 m/s)   Stair Management Assistance Not tested   Balance   Static Sitting Good   Static Standing Fair   Ambulatory Fair -; TUG 10 seconds w/o device   Endurance Deficit   Endurance Deficit Yes   Endurance Deficit Description self reported fatigu, but Spo2 96% at end of trial   Activity Tolerance   Activity Tolerance Patient limited by fatigue   Medical Staff Made Aware spoke to [facility] from neuro   Nurse Made Aware spoke to Jonathan Vogel and Guanaco Martinez PCA   Assessment   Prognosis Good   Problem List Decreased strength;Decreased endurance; Impaired balance;Decreased mobility; Decreased cognition; Impaired judgement;Decreased coordination; Impaired hearing;Obesity  (gait deviations)   Barriers to Discharge Decreased caregiver support; Inaccessible home environment  (+ pt is primary caregiver to her spouse now on hospice)   Goals   Patient Goals to get home in time for 's hospital bed delivery tomorrow   STG Expiration Date 02/03/21   PT Treatment Day 0   Plan   Treatment/Interventions Functional transfer training;LE strengthening/ROM; Elevations; Therapeutic exercise; Endurance training;Cognitive reorientation;Gait training;Bed mobility; Equipment eval/education;Patient/family training;Spoke to nursing;Spoke to advanced practitioner   PT Frequency 5x/wk   Recommendation   PT Discharge Recommendation Return to previous environment with social support  (and potentially more assistance for her/spouse @ home)   Equipment Recommended   (likely cane PRN)   AM-PAC Basic Mobility Inpatient   Turning in Bed Without Bedrails 4   Lying on Back to Sitting on Edge of Flat Bed 4   Moving Bed to Chair 4   Standing Up From Chair 4   Walk in Room 3   Climb 3-5 Stairs 3   Basic Mobility Inpatient Raw Score 22   Basic Mobility Standardized Score 47 4   (Please find full objective findings from PT assessment regarding body systems outlined above)  4 Item Dynamic Gait Index  2/3 Gait level surface  1/3 Change in gait speed  2/3 Gait with horizontal head turns  1/3 Gait with vertical head turns  6/12 total score (<10/12 indicates increased risk of fall)      Assessment: Pt is a 78 y o  female seen for PT evaluation s/p admit to [facility] on 1/23/2021 w/ Stroke-like episode  Order placed for PT  Upon evaluation: Pt requires S assistance for transfers and intermittent min assistance for ambulation with out device  Pt's clinical presentation is currently unstable/unpredictable given the functional mobility deficits above, especially (but not limited to) decreased endurance, gait deviations and decreased functional mobility tolerance, coupled with fall risks including slower than anticipated gait speed, worse than cut off for 4 point DGI,  impaired balance, impaired coordination and ? cognition, and combined with medical complications of hypertension  and abnormal potassium values  Pt to benefit from continued skilled PT tx while in hospital and upon DC to address deficits as defined above and maximize level of functional mobility   Recommend trial with cane next 1-2 sessions and higher level balance activities, stairs  Goals: Pt will: Perform bed mobility tasks with modified I to prepare for transfers and reposition in bed  Perform transfers with consistent modified I to improve ease of transfers  Perform ambulation with +/-cane for >150' with  Supervision  to increase Indep in home environment and decrease risk for falls  Perform flight of stairs w/ railing and w/Supervision to return to home with YOSVANY and return to multilevel home  Increase 4 point DGI score to closer to 10/12 to demonstrate less risk for falls  Increase gait speed +/- cane to  70 to demonstrate less risk for falls  Comorbidities affecting pt's physical performance at time of assessment include: HTN, obesity, limited hearing and brain procedure for benign mass, arthritis (B LEs), back surgery L4-5,carotid artery narrowing, fibromyalgia  Personal factors affecting pt at time of IE include: unable to perform caregiver support/tasks, anxiety, steps to enter environment, multi-level environment, limited home support, past experience, behavioral pattern and inability to navigate community distances       Vandana Schwartz, PT, DPT

## 2021-01-24 NOTE — ASSESSMENT & PLAN NOTE
· NIH 1 in ED for Ataxia, no other symptoms reported  · Neuro c/s decided to give TPA 1/23 0900  · F/u CTH in 24 hrs today @ 1000  · MRI pending- pt states she will need sedation  · Passed swallow eval with nursing Speech c/s pending  · PMR, PT/ OT c/s  · Patient reusing Lipitor  · Mainttain -180 will clarify with neuro if can back down the BP to 140-160

## 2021-01-24 NOTE — PLAN OF CARE
Problem: Potential for Falls  Goal: Patient will remain free of falls  Description: INTERVENTIONS:  - Assess patient frequently for physical needs  -  Identify cognitive and physical deficits and behaviors that affect risk of falls    -  [place] fall precautions as indicated by assessment   - Educate patient/family on patient safety including physical limitations  - Instruct patient to call for assistance with activity based on assessment  - Modify environment to reduce risk of injury  - Consider OT/PT consult to assist with strengthening/mobility  Outcome: Progressing

## 2021-01-24 NOTE — ASSESSMENT & PLAN NOTE
At the time of presentation yesterday this patient was clearly disequilibrium is  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  Despite her reporting that she had symptoms the night before she reports that when she went to bed and then woke up yesterday morning at 5:30 a m  She initially woke up normal in her symptoms started again a few minutes after that  Because of that are tPA clock yesterday was 5:30 a m     She presented to hospital had such refractory hypertension we were still able to administer tPA however was approximately 3 hours and 35 minutes as opposed to within the 3 hour window that we would have preferred  Overnight she has tolerated ICU monitoring well  She is still within the window of 24 hour post tPA monitoring  We have not yet obtained her labs  We have gotten her CT head as noted above

## 2021-01-24 NOTE — ASSESSMENT & PLAN NOTE
Concerning this patient's cerebral vascular event, she reports she had transient symptoms the evening prior to admission, the 22nd, similar to those that she had on the morning of the 23rd which is what actually triggered her presentation  Her initial complaints after waking normal on the 23rd, were reports that she felt unable to walk straight she had to hold onto things to walk, she had this sudden onset of the disequilibrius symptoms  She presented to the ED with blood pressures in the range of 200  Her NIH score is low but after gating her myself she is clearly impaired  She has had multiple medication reactions by her report limiting our ability to quickly lower her blood pressure below 185 for tPA  She was given tPA and has been in ICU overnight without adverse events  Exam today demonstrates to improve disequilibrium with exam   Her heel to shin, left on right, is improved and she is able to stand unassisted without the wide base that she had yesterday and she had none of the sway, or retropulsion that she had yesterday  I was able to gait her with a handhold just within the space of the room she did much better  24 H repeat CT of head appears stable c/w admission scan  ok for ASA @ 24 H if no bleed  MRI of brain pending, simply because of her head reports of claustrophobia she may not be able to have an MRI of the brain  We will try with a small dose of fentanyl and midazolam   - long d/w patient re Lipitor reason and benefit   - Begin BP normalization   Avoid hypotension -    BS control  Echo report pending   Aspiration precautions  DVT prevention  Secondary risk factor modification   Neurological checks; notify neurology or critical care team with any changes  She may not need inpatient rehab and simply may benefit from outpatient PT at discharge

## 2021-01-25 VITALS
HEIGHT: 68 IN | RESPIRATION RATE: 14 BRPM | HEART RATE: 80 BPM | OXYGEN SATURATION: 98 % | BODY MASS INDEX: 30.54 KG/M2 | TEMPERATURE: 97.6 F | DIASTOLIC BLOOD PRESSURE: 80 MMHG | SYSTOLIC BLOOD PRESSURE: 150 MMHG | WEIGHT: 201.5 LBS

## 2021-01-25 PROBLEM — R91.1 LUNG NODULE: Status: ACTIVE | Noted: 2021-01-25

## 2021-01-25 PROBLEM — I63.9 STROKE (CEREBRUM) (HCC): Status: ACTIVE | Noted: 2021-01-23

## 2021-01-25 PROCEDURE — 99239 HOSP IP/OBS DSCHRG MGMT >30: CPT | Performed by: PHYSICIAN ASSISTANT

## 2021-01-25 RX ORDER — CLONIDINE HYDROCHLORIDE 0.1 MG/1
0.3 TABLET ORAL 2 TIMES DAILY
Status: DISCONTINUED | OUTPATIENT
Start: 2021-01-25 | End: 2021-01-25 | Stop reason: HOSPADM

## 2021-01-25 RX ORDER — ATORVASTATIN CALCIUM 40 MG/1
40 TABLET, FILM COATED ORAL
Status: DISCONTINUED | OUTPATIENT
Start: 2021-01-25 | End: 2021-01-25 | Stop reason: HOSPADM

## 2021-01-25 RX ORDER — CLONIDINE HYDROCHLORIDE 0.1 MG/1
0.1 TABLET ORAL ONCE
Status: COMPLETED | OUTPATIENT
Start: 2021-01-25 | End: 2021-01-25

## 2021-01-25 RX ORDER — ATORVASTATIN CALCIUM 40 MG/1
40 TABLET, FILM COATED ORAL
Qty: 30 TABLET | Refills: 0 | Status: SHIPPED | OUTPATIENT
Start: 2021-01-25

## 2021-01-25 RX ADMIN — CLONIDINE HYDROCHLORIDE 0.1 MG: 0.1 TABLET ORAL at 12:09

## 2021-01-25 RX ADMIN — LABETALOL 20 MG/4 ML (5 MG/ML) INTRAVENOUS SYRINGE 10 MG: at 09:01

## 2021-01-25 RX ADMIN — CLONIDINE HYDROCHLORIDE 0.2 MG: 0.1 TABLET ORAL at 09:01

## 2021-01-25 NOTE — DISCHARGE INSTR - AVS FIRST PAGE
Dear Hawa Solorzano,     It was our pleasure to care for you here at [place], [facility]  It is our hope that we were always able to exceed the expected standards for your care during your stay  You were hospitalized due to stroke, having received tPA  You were cared for on the 3rd floor by Pablito Herrera PA-C under the service of Sami Fu MD with the [facility] who covers for your primary care physician (PCP), Heike Jaimes, while you were hospitalized  If you have any questions or concerns related to this hospitalization, you may contact us at [phone]  For follow up as well as any medication refills, we recommend that you follow up with your primary care physician  A registered nurse will reach out to you by phone within a few days after your discharge to answer any additional questions that you may have after going home  However, at this time we provide for you here, the most important instructions / recommendations at discharge:     · Notable Medication Adjustments -   · Continue aspirin and your blood pressure meds  · Add lipitor 40 mg daily  · Testing Required after Discharge -   · none  · Important follow up information -   · Follow-up with your family doctor to have your blood pressure rechecked and followed closely  In addition, a small lung nodule was seen incidentally on the CT scan picture you had done of your neck  Your family doctor should order a repeat CT scan of your lungs in 3 months to follow-up on this for completeness  · Other Instructions -   · Follow-up with neurology  · Please review this entire after visit summary as additional general instructions including medication list, appointments, activity, diet, any pertinent wound care, and other additional recommendations from your care team that may be provided for you        Sincerely,     Pablito Herrera PA-C

## 2021-01-25 NOTE — APP STUDENT NOTE
Assessment/Plan:  1  Stroke-like symptoms   -NIH 1 in ED for ataxia    -TPA given in ED on    -MRI: "Tiny focus of recent ischemia in the subcortical posterior right parietal parenchyma, without hemorrhage or mass effect  Moderate degree of chronic small vessel disease "   -/67    -Continue catapres, doxazosin    -Continue ASA   -Start lipitor; patient refused on prior hospital days     2  Hypertension    -Continue Catapres    3  Hyperlipidemia    -Start lipitor     VTE Pharmacologic Prophylaxis:   Pharmacologic:   Mechanical VTE Prophylaxis in Place: No    Patient Centered Rounds: I have performed bedside rounds with nursing staff today  Discussions with Specialists or Other Care Team Provider:     Education and Discussions with Family / Patient: patient     Time Spent for Care: 30 minutes  More than 50% of total time spent on counseling and coordination of care as described above  Current Length of Stay: 2 day(s)    Current Patient Status: Inpatient   Certification Statement: The patient, who was admitted as an inpatient, is being discharged sooner than originally anticipated due to: PT/OT eval cleared, no bleed on MRI    Discharge Plan: DC today    Code Status: Level 1 - Full Code      Subjective: The patient reports that she had 3 episodes of diarrhea last night  She would like to know what her MRI and ECHO results are  She denies chest pain, SOB, dizziness, numbness and tingling, nausea, vomiting, diarrhea  Objective:     Vitals:   Temp (24hrs), Av 7 °F (36 5 °C), Min:97 6 °F (36 4 °C), Max:97 7 °F (36 5 °C)    Temp:  [97 6 °F (36 4 °C)-97 7 °F (36 5 °C)] 97 6 °F (36 4 °C)  HR:  [60-80] 80  Resp:  [14-67] 14  BP: (138-210)/(65-90) 210/90  SpO2:  [96 %-98 %] 98 %  Body mass index is 30 64 kg/m²  Input and Output Summary (last 24 hours):        Intake/Output Summary (Last 24 hours) at 2021 0846  Last data filed at 2021 0600  Gross per 24 hour   Intake 240 ml   Output 0 ml Net 240 ml       Physical Exam:     Physical Exam  Constitutional:       General: She is not in acute distress  Appearance: She is not toxic-appearing  HENT:      Head: Normocephalic and atraumatic  Eyes:      Extraocular Movements: Extraocular movements intact  Pupils: Pupils are equal, round, and reactive to light  Cardiovascular:      Rate and Rhythm: Normal rate and regular rhythm  Heart sounds: No murmur  No friction rub  No gallop  Pulmonary:      Effort: Pulmonary effort is normal       Breath sounds: No wheezing, rhonchi or rales  Abdominal:      General: Bowel sounds are normal       Tenderness: There is no abdominal tenderness  There is no guarding  Musculoskeletal:      Right lower leg: No edema  Left lower leg: No edema  Skin:     General: Skin is warm  Findings: No bruising or erythema  Neurological:      General: No focal deficit present  Mental Status: She is alert and oriented to person, place, and time  Additional Data:     Labs:    Results from last 7 days   Lab Units 01/24/21  1603   WBC Thousand/uL 8 84   HEMOGLOBIN g/dL 13 2   HEMATOCRIT % 41 6   PLATELETS Thousands/uL 243   NEUTROS PCT % 73   LYMPHS PCT % 16   MONOS PCT % 7   EOS PCT % 2     Results from last 7 days   Lab Units 01/24/21  1603   SODIUM mmol/L 141   POTASSIUM mmol/L 3 6   CHLORIDE mmol/L 106   CO2 mmol/L 27   BUN mg/dL 11   CREATININE mg/dL 1 00   ANION GAP mmol/L 8   CALCIUM mg/dL 8 8   GLUCOSE RANDOM mg/dL 96     Results from last 7 days   Lab Units 01/23/21  0736   INR  1 05     Results from last 7 days   Lab Units 01/23/21  0724   POC GLUCOSE mg/dl 115     Results from last 7 days   Lab Units 01/23/21  0736   HEMOGLOBIN A1C % 5 5               * I Have Reviewed All Lab Data Listed Above  * Additional Pertinent Lab Tests Reviewed:  All Labs For Current Hospital Admission Reviewed    Imaging:    Imaging Reports Reviewed Today Include:   Imaging Personally Reviewed by Myself Includes:      Recent Cultures (last 7 days):           Last 24 Hours Medication List:   Current Facility-Administered Medications   Medication Dose Route Frequency Provider Last Rate    cloNIDine  0 2 mg Oral BID Fabian Brothers, CRNP      doxazosin  2 mg Oral HS Fabian Brothers, CRNP      hydrALAZINE  10 mg Intravenous Q6H PRN Fabian Hnedricksoners, CRNP      Labetalol HCl  10 mg Intravenous Q4H PRN Fabian Brothers, CRNP          Today, Patient Was Seen By: Anh Huffman    ** Please Note: Dictation voice to text software may have been used in the creation of this document   **

## 2021-01-25 NOTE — ASSESSMENT & PLAN NOTE
Patient reports Hx of carotid stenosis bilaterally    Her CTA done today demonstrates mild stenosis bilaterally more distal rather than proximal

## 2021-01-25 NOTE — ASSESSMENT & PLAN NOTE
· Patient presented with stroke-like symptoms and received tPA    · She was placed on stroke pathway and followed by Neurology  · MRI of the brain showed tiny focus of acute ischemia  · Patient is otherwise asymptomatic at this time and doing quite well  · Recommended to continue aspirin and add Lipitor 40 mg q p m  to her regimen

## 2021-01-25 NOTE — ASSESSMENT & PLAN NOTE
· Blood pressure initially noted to be severely elevated  Patient reports numerous allergies to medications and only tolerates being on Cardura and Catapres  Noted that we were giving her less than her usual dose of Catapres  Would get her back on 0 3 mg b i d   Now  · Continue to monitor outpatient

## 2021-01-25 NOTE — CASE MANAGEMENT
LOS 2 days, 30 Readmission: No, Bundle Patient: No    CM met with patient at bedside to discuss discharge planning  Pt is awake, alert, oriented x 3 sitting in recliner chair  Pt lives in a 2 story home with her spouse who is presently on home hospice  Pt has assistance caring for her spouse through hospice with nursing and HHA  Pt states her home is 2 levels with five steps to enter and fifteen steps to the upstairs  Pt is independent with ADL's and does not use any DME  Pt states she has DME in the home for her  including a hospital bed, walker, and commode  Pt still drives and provides transportation for her appointments and her 's  Pt had VNA services in the past with [facility]  Pt has never been to [address] [place] [address]  Pt is pharmacy is [facility] on [address] [place] [address]   Patient stated she is able to afford her medications and has prescription coverage  Patient denied  history of MH, drug or alcohol abuse  Pt's  is her POA  Pt stated she has a living will and it is on file here at [address] [facility]  Pt is retired and collects SS and pension benefits  Pt's son lives very close by her and assists her with care for her spouse  No DC needs identified  CM reviewed discharge planning process including the following: identifying caregivers at home, preference for d/c planning needs, availability of [facility] to [facility] program, availability of treatment team to discuss questions or concerns patient and/or family may have regarding diagnosis, plan of care, old or new medications and discharge planning   CM will continue to follow for care coordination and update assessment as appropriate

## 2021-01-25 NOTE — DISCHARGE SUMMARY
Discharge- Siva Ramirez 1945, 78 y o  female MRN: [id number]    Unit/Bed#: S -01 Encounter: [id number]    Primary Care Provider: Trace Nelson   Date and time admitted to hospital: 1/23/2021  7:09 AM  * Stroke (cerebrum) [facility])  Assessment & Plan  · Patient presented with stroke-like symptoms and received tPA  · She was placed on stroke pathway and followed by Neurology  · MRI of the brain showed tiny focus of acute ischemia  · Patient is otherwise asymptomatic at this time and doing quite well  · Recommended to continue aspirin and add Lipitor 40 mg q p m  to her regimen    Hypertensive emergency  Assessment & Plan  · Blood pressure initially noted to be severely elevated  Patient reports numerous allergies to medications and only tolerates being on Cardura and Catapres  Noted that we were giving her less than her usual dose of Catapres  Would get her back on 0 3 mg b i d   Now  · Continue to monitor outpatient    Lung nodule  Assessment & Plan  · Incidentally seen on CT angiogram--outpatient follow-up recommended    Discharging Physician / Practitioner: Les Nation PA-C  PCP: Trace Nelson  Admission Date:   Admission Orders (From admission, onward)     Ordered        01/23/21 0922  Inpatient Admission  Once                   Discharge Date: 01/25/21    Resolved Problems  Date Reviewed: 1/25/2021    None          Consultations During Hospital Stay:  · neurology    Procedures Performed:   · MRI brain  · tpa  · CTA Head/neck  · 2d Echo  · CT head    Significant Findings / Test Results:   · As above    Incidental Findings:   · Lung nodule    Test Results Pending at Discharge (will require follow up):   · none     Outpatient Tests Requested:  · CT chest 3 months  · FLP/LFTs in 8 weeks    Complications:  hypertension    Reason for Admission:  Stroke-like symptoms    Hospital Course:     Siva Ramirez is a 78 y o  female patient who originally presented to the hospital on 1/23/2021 due to stroke-like symptoms including slurred speech, difficulty with balance/disequilibrium and numbness of the left hand  The symptoms were self-limiting but returned and upon arrival to the emergency department, she was seen by Neurology who opted to give tPA  She was then monitored in the critical care unit and was deemed to be stable and transferred to the regular medical floor  MRI of the brain did show a very tiny area of ischemia but patient was essentially asymptomatic  She is to be maintained on aspirin and Lipitor was added to her regimen  It is noted that she has difficult to control hypertension overall but has numerous allergies  She is to continue Cardura and Catapres  She was seen by PT and OT and did not require any inpatient rehab at discharge    Please see above list of diagnoses and related plan for additional information  Condition at Discharge: stable     Discharge Day Visit / Exam:     Subjective:  Patient has soft, non watery BM  No pain  No other events  Vitals: Blood Pressure: 150/80 (01/25/21 1211)  Pulse: 80 (01/25/21 0824)  Temperature: 97 6 °F (36 4 °C) (01/25/21 0824)  Temp Source: Oral (01/25/21 0824)  Respirations: 14 (01/25/21 0824)  Height: 5' 8" (172 7 cm) (01/23/21 1117)  Weight - Scale: 91 4 kg (201 lb 8 oz) (01/25/21 0600)  SpO2: 98 % (01/24/21 2229)  Exam:   Physical Exam  Vitals signs reviewed  Exam conducted with a chaperone present  Constitutional:       General: She is not in acute distress  Appearance: Normal appearance  She is not ill-appearing, toxic-appearing or diaphoretic  HENT:      Head: Normocephalic and atraumatic  Eyes:      General: No scleral icterus  Right eye: No discharge  Left eye: No discharge  Conjunctiva/sclera: Conjunctivae normal    Cardiovascular:      Rate and Rhythm: Normal rate and regular rhythm  Heart sounds: No murmur  Pulmonary:      Effort: Pulmonary effort is normal  No respiratory distress        Breath sounds: Normal breath sounds  No stridor  No wheezing, rhonchi or rales  Comments: Grossly normal but patient was talking throughout the entirety of my exam   No tachypnea or dyspnea  Abdominal:      General: Bowel sounds are normal  There is no distension  Palpations: Abdomen is soft  Tenderness: There is no abdominal tenderness  There is no guarding  Musculoskeletal:      Right lower leg: No edema  Left lower leg: No edema  Skin:     General: Skin is warm and dry  Coloration: Skin is not jaundiced or pale  Findings: No bruising, erythema, lesion or rash  Neurological:      General: No focal deficit present  Mental Status: She is alert  Comments: Awake alert interactive, no facial asymmetry  No dysarthria or aphasia   strength is equal   No lower extremity weakness noted       Telemetry reviewed  Discussion with Family:  over phone    Discharge instructions/Information to patient and family:   See after visit summary for information provided to patient and family  Provisions for Follow-Up Care:  See after visit summary for information related to follow-up care and any pertinent home health orders  Disposition: home    Planned Readmission: none     Discharge Statement:  I spent 35 minutes discharging the patient  This time was spent on the day of discharge  I had direct contact with the patient on the day of discharge  Greater than 50% of the total time was spent examining patient, answering all patient questions, arranging and discussing plan of care with patient as well as directly providing post-discharge instructions  Additional time then spent on discharge activities  Case d/w neurology and case mgmt  Bedside nursing rounds performed    Discharge Medications:  See after visit summary for reconciled discharge medications provided to patient and family        ** Please Note: This note has been constructed using a voice recognition system **

## 2021-01-25 NOTE — SPEECH THERAPY NOTE
Speech Language/Pathology  Speech/Language Pathology  Assessment    Patient Name: Kimberley Abreu  BLYYR'E Date: 1/25/2021     Problem List  Principal Problem:    Stroke-like episode  Active Problems:    Hyperlipidemia    Essential hypertension    Bilateral carotid artery disease (HCC)    Received intravenous tissue plasminogen activator (tPA) in emergency department    Kimberley Abreu is a 78 y o  female w/ pmhx of HTN and HLD who presents with ataxia  ED reports that patient reported slurred speech, ataxia and mild numbness of left hand last night  These symptoms were self limiting and resolved on their own  She reports falling asleep on the couch and when she awoke she was able to walk to the bathroom but on her return back she felt unsteady and off balance again  She denies any other symptoms and came tot he ED for evaluation  NIH reported at 1 and decision made by neuro to give TPA  Consult received for speech/swallow eval on stroke pathway  Pt passed nsg swallow screen; tolerating regular diet w/o s/s dysphagia or aspiration  No speech/language deficits reported  NIH score is 0  MRI: 1/24/21 Tiny focus of recent ischemia in the subcortical posterior right parietal parenchyma, without hemorrhage or mass effect  Moderate degree of chronic small vessel disease  No need for formal speech/swallow eval at this time  Reconsult if needed      Malina Ac CCC-SLP  Speech Pathogist  Available via  tiger text

## 2021-01-27 ENCOUNTER — TELEPHONE (OUTPATIENT)
Dept: NEUROLOGY | Facility: CLINIC | Age: 76
End: 2021-01-27

## 2021-01-27 NOTE — TELEPHONE ENCOUNTER
1ST ATTEMPT Called twice, got a busy signal both times  Will try again tomorrow  SLRA/Stroke Alert/Medicare/[employer]    NOTE FROM CHART:  Reason for Consult / Principal Problem:  Stroke alert  Mary Milagros will need follow up in in 4 weeks with neurovascular attending or advance practitioner  She may not require further outpatient testing  Thor Walker

## 2021-01-28 NOTE — TELEPHONE ENCOUNTER
Sched HFU appt 4/30/2021 with Dr Hodges January in [place]  Pt wants Dr Hodges January only  Mailed NP paperwork to pt's home

## 2021-01-29 ENCOUNTER — PATIENT OUTREACH (OUTPATIENT)
Dept: CASE MANAGEMENT | Facility: OTHER | Age: 76
End: 2021-01-29

## 2021-01-29 NOTE — TELEPHONE ENCOUNTER
Post CVA Discharge Follow Up    Hospitalization: 1/23-1/25  The purpose of this phone call is to assess patient's general wellbeing or for any assistance needed with follow-up care  Called patient, I introduced myself and explained neurovascular nurse navigator role  Since discharge, she denies experiencing any new or worsening stroke-like symptoms  States she "still walks crooked", notes her balance is inhibited  Ambulation / ADLs:  Patient claims she is ambulating independently as well as preforming her own ADLs  Patient manages her own medications, appointments, and affairs  Appointments / Medication Review:  Patient successfully followed up with PCP yesterday  Stroke hospital follow up appointment already scheduled  I reviewed medications with her  PCP increased catapres 0 2 mg two tablets twice a day  Patient reports having no difficulties obtaining medications  Reports she is taking all as prescribed with no missed doses, medication side effects, or signs of bleeding  Risk Factors / Education:  Patient verbalizes clear understanding of stroke type, symptoms, personal risk factors and management, medications, and resources  Patient reports she monitors BP at home  Reported average BP continues to be 140/80 and her last was 136/74  States she is not interested in changes her diet too much  She eats a low salt diet, she avoids sweets and junk, but she has a small cup of vanilla cream ice cream at nighttime "as her treat"  She avoids milk and bread  States she is trying to have more chicken and vegetables  Patient is coping with humor and states she is doing rather well  She is a non smoker  [facility] support as she expressed her feelings about being her husbands primary caregiver while he is now on hospice care  I addressed all her questions  At the conclusion of the conversation, patient denies having any further questions or concerns

## 2021-01-29 NOTE — PROGRESS NOTES
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

## 2021-04-26 ENCOUNTER — EPISODE CHANGES (OUTPATIENT)
Dept: CASE MANAGEMENT | Facility: OTHER | Age: 76
End: 2021-04-26

## 2022-03-30 ENCOUNTER — HOSPITAL ENCOUNTER (OUTPATIENT)
Dept: RADIOLOGY | Facility: HOSPITAL | Age: 77
Discharge: HOME | End: 2022-03-30
Payer: MEDICARE

## 2022-03-30 VITALS — BODY MASS INDEX: 27.28 KG/M2 | WEIGHT: 180 LBS | HEIGHT: 68 IN

## 2022-03-30 DIAGNOSIS — Z12.31 ENCOUNTER FOR SCREENING MAMMOGRAM FOR MALIGNANT NEOPLASM OF BREAST: ICD-10-CM

## 2022-03-30 PROCEDURE — 77063 BREAST TOMOSYNTHESIS BI: CPT

## 2022-03-30 PROCEDURE — 77067 SCR MAMMO BI INCL CAD: CPT

## 2022-06-13 ENCOUNTER — HOSPITAL ENCOUNTER (EMERGENCY)
Facility: HOSPITAL | Age: 77
Discharge: HOME | End: 2022-06-13
Attending: EMERGENCY MEDICINE
Payer: MEDICARE

## 2022-06-13 ENCOUNTER — APPOINTMENT (EMERGENCY)
Dept: RADIOLOGY | Facility: HOSPITAL | Age: 77
End: 2022-06-13
Payer: MEDICARE

## 2022-06-13 VITALS
OXYGEN SATURATION: 99 % | TEMPERATURE: 97.8 F | HEART RATE: 68 BPM | SYSTOLIC BLOOD PRESSURE: 162 MMHG | DIASTOLIC BLOOD PRESSURE: 71 MMHG | RESPIRATION RATE: 17 BRPM

## 2022-06-13 DIAGNOSIS — R19.7 DIARRHEA, UNSPECIFIED TYPE: ICD-10-CM

## 2022-06-13 DIAGNOSIS — R11.0 NAUSEA: ICD-10-CM

## 2022-06-13 DIAGNOSIS — K52.9 ENTERITIS: Primary | ICD-10-CM

## 2022-06-13 LAB
ALBUMIN SERPL BCP-MCNC: 4 G/DL (ref 3.5–5)
ALP SERPL-CCNC: 60 U/L (ref 34–104)
ALT SERPL W P-5'-P-CCNC: 30 U/L (ref 7–52)
ANION GAP SERPL CALCULATED.3IONS-SCNC: 8 MMOL/L (ref 4–13)
AST SERPL W P-5'-P-CCNC: 26 U/L (ref 13–39)
BASOPHILS # BLD AUTO: 0.04 THOUSANDS/ΜL (ref 0–0.1)
BASOPHILS NFR BLD AUTO: 0 % (ref 0–1)
BILIRUB SERPL-MCNC: 0.93 MG/DL (ref 0.2–1)
BUN SERPL-MCNC: 24 MG/DL (ref 5–25)
CALCIUM SERPL-MCNC: 9.3 MG/DL (ref 8.4–10.2)
CHLORIDE SERPL-SCNC: 105 MMOL/L (ref 96–108)
CO2 SERPL-SCNC: 30 MMOL/L (ref 21–32)
CREAT SERPL-MCNC: 1.12 MG/DL (ref 0.6–1.3)
EOSINOPHIL # BLD AUTO: 0.03 THOUSAND/ΜL (ref 0–0.61)
EOSINOPHIL NFR BLD AUTO: 0 % (ref 0–6)
ERYTHROCYTE [DISTWIDTH] IN BLOOD BY AUTOMATED COUNT: 18.3 % (ref 11.6–15.1)
GFR SERPL CREATININE-BSD FRML MDRD: 48 ML/MIN/1.73SQ M
GLUCOSE SERPL-MCNC: 108 MG/DL (ref 65–140)
HCT VFR BLD AUTO: 41.6 % (ref 34.8–46.1)
HGB BLD-MCNC: 13 G/DL (ref 11.5–15.4)
IMM GRANULOCYTES # BLD AUTO: 0.06 THOUSAND/UL (ref 0–0.2)
IMM GRANULOCYTES NFR BLD AUTO: 0 % (ref 0–2)
LIPASE SERPL-CCNC: 47 U/L (ref 11–82)
LYMPHOCYTES # BLD AUTO: 1.82 THOUSANDS/ΜL (ref 0.6–4.47)
LYMPHOCYTES NFR BLD AUTO: 13 % (ref 14–44)
MCH RBC QN AUTO: 28.6 PG (ref 26.8–34.3)
MCHC RBC AUTO-ENTMCNC: 31.3 G/DL (ref 31.4–37.4)
MCV RBC AUTO: 92 FL (ref 82–98)
MONOCYTES # BLD AUTO: 0.35 THOUSAND/ΜL (ref 0.17–1.22)
MONOCYTES NFR BLD AUTO: 3 % (ref 4–12)
NEUTROPHILS # BLD AUTO: 11.27 THOUSANDS/ΜL (ref 1.85–7.62)
NEUTS SEG NFR BLD AUTO: 84 % (ref 43–75)
NRBC BLD AUTO-RTO: 0 /100 WBCS
PLATELET # BLD AUTO: 263 THOUSANDS/UL (ref 149–390)
PMV BLD AUTO: 11.2 FL (ref 8.9–12.7)
POTASSIUM SERPL-SCNC: 3.6 MMOL/L (ref 3.5–5.3)
PROT SERPL-MCNC: 6.6 G/DL (ref 6.4–8.4)
RBC # BLD AUTO: 4.54 MILLION/UL (ref 3.81–5.12)
SODIUM SERPL-SCNC: 143 MMOL/L (ref 135–147)
WBC # BLD AUTO: 13.57 THOUSAND/UL (ref 4.31–10.16)

## 2022-06-13 PROCEDURE — 80053 COMPREHEN METABOLIC PANEL: CPT | Performed by: EMERGENCY MEDICINE

## 2022-06-13 PROCEDURE — 83690 ASSAY OF LIPASE: CPT | Performed by: EMERGENCY MEDICINE

## 2022-06-13 PROCEDURE — 36415 COLL VENOUS BLD VENIPUNCTURE: CPT | Performed by: EMERGENCY MEDICINE

## 2022-06-13 PROCEDURE — 99284 EMERGENCY DEPT VISIT MOD MDM: CPT | Performed by: EMERGENCY MEDICINE

## 2022-06-13 PROCEDURE — 99284 EMERGENCY DEPT VISIT MOD MDM: CPT

## 2022-06-13 PROCEDURE — 74022 RADEX COMPL AQT ABD SERIES: CPT

## 2022-06-13 PROCEDURE — 96374 THER/PROPH/DIAG INJ IV PUSH: CPT

## 2022-06-13 PROCEDURE — 85025 COMPLETE CBC W/AUTO DIFF WBC: CPT | Performed by: EMERGENCY MEDICINE

## 2022-06-13 RX ORDER — ONDANSETRON 4 MG/1
4 TABLET, ORALLY DISINTEGRATING ORAL EVERY 6 HOURS PRN
Qty: 20 TABLET | Refills: 0 | Status: SHIPPED | OUTPATIENT
Start: 2022-06-13

## 2022-06-13 RX ORDER — ONDANSETRON 2 MG/ML
4 INJECTION INTRAMUSCULAR; INTRAVENOUS ONCE
Status: COMPLETED | OUTPATIENT
Start: 2022-06-13 | End: 2022-06-13

## 2022-06-13 RX ADMIN — ONDANSETRON 4 MG: 2 INJECTION INTRAMUSCULAR; INTRAVENOUS at 07:46

## 2022-06-13 NOTE — ED PROVIDER NOTES
[facility]  EMERGENCY DEPARTMENT NOTE      Pt Name: Claudia Newberry  MRN: [id number]  [place]:  1945  Date of evaluation:  6/13/2022  Provider: Gisel Mullins MD    CHIEF COMPLAINT     Chief Complaint   Patient presents with    Abdominal Pain     Pt with a hx of pancreatitis, with a pancreatic stent, presents this AM with n/v/d and generalized upper abdominal pain  HISTORY OF PRESENT ILLNESS  (LOCATION/SYMPTOM, TIMING/ONSET, CONTEXT/SETTING, QUALITY, DURATION, MODIFYING FACTORS, SEVERITY)   Note limiting factors  Claudia Newberry is a 78 y o  female who presents to the emergency department for nausea, abdominal pain and diarrhea  She tells me that at around 4:00 a m  this morning she woke up with the abdominal discomfort  She has a history of a prior pancreatic stent placed about 3 months ago following gallstone pancreatitis  Has she tells me that the abdominal pain is in her upper abdomen  It tends to be cramping in nature  She had a nonbloody bowel movement prior to arrival this morning  She had extreme nausea to the point that she was having episodes where she would dry heave  She then had 2 more episodes of diarrhea here in the ED   patient has not had any fever  With the prior history of pancreatitis in this stent as well as the nausea, patient got extremely concerned felt like she needed to be evaluated  HPI    Nursing notes were reviewed  REVIEW OF SYSTEMS  (2-9 systems for level 4, ten or more for level 5)     Review of Systems   Constitutional: Negative for chills and fever  HENT: Negative for ear pain and sore throat  Eyes: Negative for pain and visual disturbance  Respiratory: Negative for cough and shortness of breath  Cardiovascular: Negative for chest pain and palpitations  Gastrointestinal: Positive for abdominal distention, abdominal pain, diarrhea, nausea and vomiting     Genitourinary: Negative for dysuria and hematuria  Musculoskeletal: Negative for arthralgias and back pain  Skin: Negative for color change and rash  Neurological: Negative for seizures and syncope  All other systems reviewed and are negative  PAST MEDICAL HISTORY     Past Medical History:   Diagnosis Date    Anesthesia complication     Pt has side effects   from anesthesia EXCEPT propofol   Anxiety     Arthritis     knees    Carotid artery narrowing     60% blockage    Cataract     bilateral    Claustrophobia     Colon polyp     Family history of GERD     Fibromyalgia, primary     Full dentures     since age 25yr    Hearing loss     right ear    Heart attack ([address] ) 12/2021    History of meningioma of the brain     Hyperlipemia     Hypertension     elevates with stress, "white coat syndrome"    Macular degeneration     small area left eye    Obstruction of bowel ([address] ) 12/2021    Pancreatitis     PONV (postoperative nausea and vomiting)     with general    Wears glasses     has a prism lens         SURGICAL HISTORY     Past Surgical History:   Procedure Laterality Date    BACK SURGERY  1990    L4,5-laminectomy    BRAIN SURGERY  03/2018    benign mass-behind right ear at the base of the skull-2nd surgery to reopen to remove infection    BREAST BIOPSY Right     benign    BREAST CYST ASPIRATION Bilateral     multiple sites in the [de-identified]    COLONOSCOPY      x5    CORONARY ANGIOPLASTY WITH STENT PLACEMENT      EGD      HYSTERECTOMY  1994    complete-large ovarian cyst    LIPOMA RESECTION Left     antecubital    MAMMO NEEDLE LOCALIZATION RIGHT (ALL INC) Right 4/30/2009    MAMMO STEREOTACTIC BREAST BIOPSY RIGHT (ALL INC) Right     OOPHORECTOMY      IA XCAPSL CTRC RMVL INSJ IO LENS PROSTH W/O ECP Left 10/5/2020    Procedure: EXTRACTION EXTRACAPSULAR CATARACT PHACO INTRAOCULAR LENS (IOL);   Surgeon: Petar Al MD;  Location: [facility] MAIN OR;  Service: Ophthalmology    IA XCAPSL CTRC RMVL INSJ IO LENS PROSTH W/O ECP Right 2020    Procedure: EXTRACTION EXTRACAPSULAR CATARACT PHACO INTRAOCULAR LENS (IOL);   Surgeon: Amber Lewis MD;  Location: [facility] MAIN OR;  Service: Ophthalmology    TONSILLECTOMY      age [age] [facility]     Discharge Medication List as of 2022  9:59 AM      CONTINUE these medications which have NOT CHANGED    Details   aspirin (ECOTRIN LOW STRENGTH) 81 mg EC tablet Take by mouth every evening, Historical Med      atorvastatin (LIPITOR) 40 mg tablet Take 1 tablet (40 mg total) by mouth daily with dinner, Starting Mon 2021, Normal      cloNIDine (CATAPRES) 0 2 mg tablet Take 0 2 mg by mouth 2 (two) times a day 1 1/2 tabs BID, Historical Med      doxazosin (CARDURA) 2 mg tablet Take 2 mg by mouth daily at bedtime, Starting Thu 9/3/2020, Historical Med               ALLERGIES   Ace inhibitors, Amlodipine, Amlodipine besy-benazepril hcl, Amoxicillin-pot clavulanate, Other, Acetaminophen, Aspirin, Ibuprofen, Nebivolol, Adhesive [medical tape], Clarithromycin, Cortisone, and Sulfa antibiotics      SOCIAL HISTORY     Social History     Socioeconomic History    Marital status: /Civil Union     Spouse name: None    Number of children: None    Years of education: None    Highest education level: None   Occupational History    None   Tobacco Use    Smoking status: Former Smoker     Quit date:      Years since quittin 4    Smokeless tobacco: Never Used   Vaping Use    Vaping Use: Never used   Substance and Sexual Activity    Alcohol use: Never    Drug use: Never    Sexual activity: None   Other Topics Concern    None   Social History Narrative    None     Social Determinants of Health     Financial Resource Strain: Not on file   Food Insecurity: Not on file   Transportation Needs: Not on file   Physical Activity: Not on file   Stress: Not on file   Social Connections: Not on file   Intimate Partner Violence: Not on file   Housing Stability: Not on file Screenings       PHYSICAL EXAM  (Up to 7 for level 4, 8 or more for level 5)     Physical Exam  Vitals and nursing note reviewed  Constitutional:       Appearance: She is well-developed  She is not ill-appearing  HENT:      Head: Normocephalic and atraumatic  Mouth/Throat:      Mouth: Mucous membranes are moist       Pharynx: Oropharynx is clear  Eyes:      Extraocular Movements: Extraocular movements intact  Pupils: Pupils are equal, round, and reactive to light  Cardiovascular:      Rate and Rhythm: Normal rate and regular rhythm  Heart sounds: No murmur heard  Pulmonary:      Effort: Pulmonary effort is normal       Breath sounds: Normal breath sounds  No wheezing, rhonchi or rales  Abdominal:      General: Abdomen is flat  Bowel sounds are increased  Palpations: Abdomen is soft  Tenderness: There is generalized abdominal tenderness  There is no guarding or rebound  Skin:     General: Skin is warm and dry  Capillary Refill: Capillary refill takes less than 2 seconds  Neurological:      General: No focal deficit present  Mental Status: She is alert and oriented to person, place, and time  Cranial Nerves: No cranial nerve deficit  Psychiatric:         Mood and Affect: Mood normal          Behavior: Behavior normal          Thought Content: Thought content normal          Diagnostic results   Radiology:  Non plain film images suggest CT, ultrasound and MRI are read by the radiologist   Plain radiographic images are visualized and preliminarily interpreted by the emergency physician see below findings:    X-ray abdominal series: There is a stent in place  There is a nonspecific bowel gas pattern with no obvious obstruction or free air  XR abdomen obstruction series   ED Interpretation   There is a stent in place  There is a nonspecific bowel gas pattern with no obvious obstruction or free air  Final Result      Nonobstructive bowel gas pattern  No free air  Radiographic evidence of left colon wall thickening  Correlate clinically for colitis  No acute cardiopulmonary pathology           Workstation performed: [id number]             EKG:  All EKGs are interpreted by the emergency department physician who either signs or cosigned discharge in the absence of a cardiologist         Procedures          LABS:  Results Reviewed     Procedure Component Value Units Date/Time    Comprehensive metabolic panel [[id number]] Collected: 06/13/22 0731    Lab Status: Final result Specimen: Blood from Arm, Right Updated: 06/13/22 0753     Sodium 143 mmol/L      Potassium 3 6 mmol/L      Chloride 105 mmol/L      CO2 30 mmol/L      ANION GAP 8 mmol/L      BUN 24 mg/dL      Creatinine 1 12 mg/dL      Glucose 108 mg/dL      Calcium 9 3 mg/dL      AST 26 U/L      ALT 30 U/L      Alkaline Phosphatase 60 U/L      Total Protein 6 6 g/dL      Albumin 4 0 g/dL      Total Bilirubin 0 93 mg/dL      eGFR 48 ml/min/1 73sq m     Narrative:      [place] guidelines for Chronic Kidney Disease (CKD):     Stage 1 with normal or high GFR (GFR > 90 mL/min/1 73 square meters)    Stage 2 Mild CKD (GFR = 60-89 mL/min/1 73 square meters)    Stage 3A Moderate CKD (GFR = 45-59 mL/min/1 73 square meters)    Stage 3B Moderate CKD (GFR = 30-44 mL/min/1 73 square meters)    Stage 4 Severe CKD (GFR = 15-29 mL/min/1 73 square meters)    Stage 5 End Stage CKD (GFR <15 mL/min/1 73 square meters)  Note: GFR calculation is accurate only with a steady state creatinine    Lipase [[id number]]  (Normal) Collected: 06/13/22 0731    Lab Status: Final result Specimen: Blood from Arm, Right Updated: 06/13/22 0753     Lipase 47 u/L     CBC and differential [[id number]]  (Abnormal) Collected: 06/13/22 0731    Lab Status: Final result Specimen: Blood from Arm, Right Updated: 06/13/22 0736     WBC 13 57 Thousand/uL      RBC 4 54 Million/uL      Hemoglobin 13 0 g/dL      Hematocrit 41 6 %      MCV 92 fL      MCH 28 6 pg      MCHC 31 3 g/dL      RDW 18 3 %      MPV 11 2 fL      Platelets 938 Thousands/uL      nRBC 0 /100 WBCs      Neutrophils Relative 84 %      Immat GRANS % 0 %      Lymphocytes Relative 13 %      Monocytes Relative 3 %      Eosinophils Relative 0 %      Basophils Relative 0 %      Neutrophils Absolute 11 27 Thousands/µL      Immature Grans Absolute 0 06 Thousand/uL      Lymphocytes Absolute 1 82 Thousands/µL      Monocytes Absolute 0 35 Thousand/µL      Eosinophils Absolute 0 03 Thousand/µL      Basophils Absolute 0 04 Thousands/µL     UA w Reflex to Microscopic w Reflex to Culture [[id number]]     Lab Status: No result Specimen: Urine, Clean Catch           All other labs were within normal range or not returned as of this dictation  EMERGENCY DEPARTMENT COURSE AND DIFFERENTIAL DIAGNOSIS/MDM:   VITALS:   Vitals:    06/13/22 0712 06/13/22 0715 06/13/22 1004   BP:  150/91 162/71   BP Location:   Left arm   Pulse: 70  68   Resp: 16  17   Temp: 97 8 °F (36 6 °C)     SpO2: 99%  99%       MDM  Number of Diagnoses or Management Options  Diarrhea, unspecified type: new and requires workup  Enteritis: new and requires workup  Nausea: new and requires workup  Diagnosis management comments: Since patient's arrival, her nausea has eased to the point that she is significantly more comfortable  She has not had any further bowel movements  Patient also has had improvement in her abdominal pain  Her lab work is essentially unremarkable outside of a mild bump in her white blood count  Her liver and pancreatic enzymes are both normal   The x-ray does not show a SBO  The stent appears to be in good placement  I do not feel that patient needs a CT at this time  She has had improvement in her abdominal pain, lab work is unremarkable and she has a non acute abdomen  Her vital signs are also stable  I feel the patient is stable to go home    After careful review of history, physical and supporting data, there is a very low suspicion for an acute abdomen  Differential includes but not limited to appendicitis, perforated viscus, ischemic bowel, obstruction and infarct  The patient's symptoms have improved from presentation appears clinically well  Patient re-examined prior to discharge appears improved  Patient has been encouraged to follow-up with his/her PCP and return to the ED with any lack of improvement or worsening symptoms  1215:  Radiology call back with a read on the imaging  They believe colitis  I was essentially treating the patient for the same thing  Reassessment:       Medications   ondansetron (ZOFRAN) injection 4 mg (4 mg Intravenous Given 6/13/22 0746)       CONSULTS:    Unless otherwise noted below none  FINAL IMPRESSION     1  Enteritis    2  Nausea    3   Diarrhea, unspecified type            DISPOSITION/PLAN         PATIENT REFERRED TO:  Matti Merritt  [address] [place] [address] [address]  [phone]    Call in 2 days          DISCHARGE MEDICATIONS:  Discharge Medication List as of 6/13/2022  9:59 AM      START taking these medications    Details   ondansetron (Zofran ODT) 4 mg disintegrating tablet Take 1 tablet (4 mg total) by mouth every 6 (six) hours as needed for nausea or vomiting, Starting Mon 6/13/2022, Normal                 (Please note that portions of this note were completed with a voice recognition program   Efforts were made to amend the dictations, but occasionally words are missed transcribed )    Lana Cramer MD (electronically signed) emergency physician                             Lana Cramer MD  06/13/22 [id number] Chandler Saleem MD  06/13/22 9900

## 2023-06-09 ENCOUNTER — HOSPITAL ENCOUNTER (OUTPATIENT)
Dept: RADIOLOGY | Facility: HOSPITAL | Age: 78
Discharge: HOME | End: 2023-06-09
Payer: MEDICARE

## 2023-06-09 VITALS — WEIGHT: 192 LBS | HEIGHT: 68 IN | BODY MASS INDEX: 29.1 KG/M2

## 2023-06-09 DIAGNOSIS — Z12.31 SCREENING MAMMOGRAM FOR HIGH-RISK PATIENT: ICD-10-CM

## 2023-06-09 PROCEDURE — 77063 BREAST TOMOSYNTHESIS BI: CPT

## 2023-06-09 PROCEDURE — 77067 SCR MAMMO BI INCL CAD: CPT

## 2024-12-19 ENCOUNTER — HOSPITAL ENCOUNTER (OUTPATIENT)
Dept: RADIOLOGY | Facility: HOSPITAL | Age: 79
Discharge: HOME | End: 2024-12-19
Payer: MEDICARE

## 2024-12-19 DIAGNOSIS — Z12.31 ENCOUNTER FOR SCREENING MAMMOGRAM FOR MALIGNANT NEOPLASM OF BREAST: ICD-10-CM

## 2024-12-19 PROCEDURE — 77063 BREAST TOMOSYNTHESIS BI: CPT

## 2024-12-19 PROCEDURE — 77067 SCR MAMMO BI INCL CAD: CPT

## (undated) DEVICE — CLEARCUT® SLIT KNIFE INTREPID MICRO-COAXIAL SYSTEM 2.4 SB: Brand: CLEARCUT®; INTREPID

## (undated) DEVICE — GLOVE SRG BIOGEL 7.5

## (undated) DEVICE — EYE PACK CUSTOM -FINNEGAN

## (undated) DEVICE — MICROSURGICAL INSTRUMENT IRR. CYSTITOME 25GA STRAIGHT-REVERSE CUTTING: Brand: ALCON

## (undated) DEVICE — AIR INJECT CANNULA 27GA: Brand: OPHTHALMIC CANNULA

## (undated) DEVICE — THE MONARCH® "D" CARTRIDGE IS A SINGLE-USE POLYPROPYLENE CARTRIDGE FOR POSTERIOR CHAMBER IOL DELIVERY: Brand: MONARCH® III

## (undated) DEVICE — ACTIVE FMS W/ INTREPID* ULTRA SLEEVES, 0.9MM 45° ABS* INTREPID* BALANCED TIP: Brand: ALCON

## (undated) DEVICE — INTREPID® TRANSFORMER IA HP: Brand: INTREPID®

## (undated) DEVICE — B-H IRRIGATING CAN 19GA FLAT ANGLED 8MM: Brand: OPHTHALMIC CANNULA